# Patient Record
Sex: FEMALE | Race: WHITE | Employment: FULL TIME | URBAN - METROPOLITAN AREA
[De-identification: names, ages, dates, MRNs, and addresses within clinical notes are randomized per-mention and may not be internally consistent; named-entity substitution may affect disease eponyms.]

---

## 2017-01-19 ENCOUNTER — APPOINTMENT (OUTPATIENT)
Dept: CT IMAGING | Age: 57
End: 2017-01-19
Attending: FAMILY MEDICINE
Payer: COMMERCIAL

## 2017-01-19 ENCOUNTER — HOSPITAL ENCOUNTER (OUTPATIENT)
Dept: RESPIRATORY THERAPY | Age: 57
Discharge: HOME | End: 2017-01-19
Attending: FAMILY MEDICINE
Payer: COMMERCIAL

## 2017-01-19 DIAGNOSIS — R05.9 COUGH: ICD-10-CM

## 2017-01-19 PROCEDURE — 74011250636 HC RX REV CODE- 250/636: Performed by: FAMILY MEDICINE

## 2017-01-19 PROCEDURE — 94070 EVALUATION OF WHEEZING: CPT

## 2017-01-19 RX ADMIN — METHACHOLINE CHLORIDE 25 MG: 100 POWDER, FOR SOLUTION RESPIRATORY (INHALATION) at 14:30

## 2017-01-19 RX ADMIN — METHACHOLINE CHLORIDE 0.25 MG: 100 POWDER, FOR SOLUTION RESPIRATORY (INHALATION) at 14:30

## 2017-01-19 RX ADMIN — METHACHOLINE CHLORIDE 2.5 MG: 100 POWDER, FOR SOLUTION RESPIRATORY (INHALATION) at 14:30

## 2017-01-19 RX ADMIN — METHACHOLINE CHLORIDE 0.03 MG: 100 POWDER, FOR SOLUTION RESPIRATORY (INHALATION) at 14:30

## 2017-01-19 RX ADMIN — METHACHOLINE CHLORIDE 10 MG: 100 POWDER, FOR SOLUTION RESPIRATORY (INHALATION) at 14:30

## 2017-03-17 ENCOUNTER — HOSPITAL ENCOUNTER (OUTPATIENT)
Dept: MAMMOGRAPHY | Age: 57
Discharge: HOME | End: 2017-03-17
Attending: OBSTETRICS & GYNECOLOGY
Payer: COMMERCIAL

## 2017-03-17 DIAGNOSIS — Z12.31 VISIT FOR SCREENING MAMMOGRAM: ICD-10-CM

## 2017-03-17 PROCEDURE — 77067 SCR MAMMO BI INCL CAD: CPT

## 2017-06-07 ENCOUNTER — HOSPITAL ENCOUNTER (OUTPATIENT)
Dept: PHYSICAL THERAPY | Age: 57
Discharge: HOME | End: 2017-06-07
Payer: COMMERCIAL

## 2017-06-07 PROCEDURE — 97140 MANUAL THERAPY 1/> REGIONS: CPT

## 2017-06-07 PROCEDURE — 97161 PT EVAL LOW COMPLEX 20 MIN: CPT

## 2017-06-07 NOTE — PROGRESS NOTES
Ambulatory/Rehab Services H2 Model Falls Risk Assessment    Risk Factor Pts. ·   Confusion/Disorientation/Impulsivity  []    4 ·   Symptomatic Depression  []   2 ·   Altered Elimination  []   1 ·   Dizziness/Vertigo  []   1 ·   Gender (Male)  []   1 ·   Any administered antiepileptics (anticonvulsants):  []   2 ·   Any administered benzodiazepines:  []   1 ·   Visual Impairment (specify):  []   1 ·   Portable Oxygen Use  []   1 ·   Orthostatic ? BP  []   1 ·   History of Recent Falls (within 3 mos.)  []   5     Ability to Rise from Chair (choose one) Pts. ·   Ability to rise in a single movement  [x]   0 ·   Pushes up, successful in one attempt  []   1 ·   Multiple attempts, but successful  []   3 ·   Unable to rise without assistance  []   4   Total: (5 or greater = High Risk) 0     Falls Prevention Plan:   []                Physical Limitations to Exercise (specify):   []                Mobility Assistance Device (type):   []                Exercise/Equipment Adaptation (specify):    ©2010 [facility] [address] Patent #[id number].  Federal Law prohibits the replication, distribution or use without written permission from [facility] [employer]

## 2017-06-07 NOTE — PROGRESS NOTES
Pilar Fowler  : 1960 [facility] at [address] [place] [address]  [phone]   [phone]          OUTPATIENT PHYSICAL THERAPY:Initial Assessment 2017    ICD-10: Treatment Diagnosis: Pain in joint, left knee, M25.562  Patellofemoral disorders, left knee, M22.2X2  Difficulty walking, not elsewhere classified, R26.2  Precautions/Allergies:   Lactose; Penicillins; and Gluten   Fall Risk Score: 0 (? 5 = High Risk)  MD Orders: Evaluate and Treat MEDICAL/REFERRING DIAGNOSIS:  Pain in left knee [M25.562]  Chondromalacia patellae, left knee [M22.42]   DATE OF ONSET: Chronic/ exacerbation for 4 weeks  REFERRING PHYSICIAN: Pan Colorado MD  RETURN PHYSICIAN APPOINTMENT: 6 weeks     INITIAL ASSESSMENT:  Ms. Asif Miranda presents with left knee pain due to both previous surgery and old scar tissue as well as poor joint mechanics. She shows increased posterior medial knee pain related to medial gastroc and medial hamstring tendons. She is a good candidate for skilled PT in order to address listed impairments affecting her function. PROBLEM LIST (Impacting functional limitations):  1. Decreased ADL/Functional Activities  2. Decreased Transfer Abilities  3. Decreased Balance  4. Increased Pain  5. Decreased Activity Tolerance  6. Decreased Flexibility/Joint Mobility INTERVENTIONS PLANNED:  1. Balance Exercise  2. Cold  3. Electrical Stimulation  4. Heat  5. Manual Therapy  6. Neuromuscular Re-education/Strengthening  7. Range of Motion (ROM)  8. Therapeutic Activites  9. Therapeutic Exercise/Strengthening   TREATMENT PLAN:  Effective Dates: 17 TO 17. Frequency/Duration: 1 time a week for 4 weeks  GOALS: (Goals have been discussed and agreed upon with patient.)  Short-Term Functional Goals: Time Frame: 2 weeks  1. Patient will report a greater than 25% improvement in overall symptoms in order to return to activity  2.  Patient will show a greater than 2 deg increase in left knee extension in order to progress to neutral knee extension  3. Patient will be independent in all HEP for knee and hip mobility  Discharge Goals: Time Frame: 4 weeks  4. Patient will report a greater than 50% improvement in overall symptoms in order to return to activity  5. Patient will show a greater than 5 point increase on the LEFS in order to show an increase in function. 6. Patient will return to exercises in the gym for increased function  7. Patient will be independent in all HEP for knee and hip mobility and knee stability  Rehabilitation Potential For Stated Goals: Good  Regarding Yuridia Long's therapy, I certify that the treatment plan above will be carried out by a therapist or under their direction. Thank you for this referral,  Joyce Nunez PT     Referring Physician Signature: Camryn Suárez MD              Date                    The information in this section was collected on 6/7/17 (except where otherwise noted). HISTORY:   History of Present Injury/Illness (Reason for Referral):  Patient reports that she has had knee issues since she had the surgery in 1977 for the patella. They had kept her in a leg brace for several weeks and she still has some lack of motion back then. Since then she gets some intermittent knee pain on the inside of the patella and lately more on the back of the knee. It is exacerbated with increased stretching and in the morning more than the afternoon. I can go up stairs just fine sometimes and other times have a little trouble. X-rays show no change in arthritis over a three year time frame, a bakers cyst is present and it is inconclusive for a meniscus tear. Patient did receive a cortisone injection last week that helped a lot with the pain in the medial patella  Past Medical History/Comorbidities:   Ms. Mary Aguirre  has a past medical history of Aortic regurgitation; Basal cell carcinoma (2012);  Endometriosis; Goiter; Hypothyroidism due to Hashimoto's thyroiditis; Lactose intolerance; and Osteopenia (2014). Ms. Magdalena López  has a past surgical history that includes  section; cholecystectomy; orthopaedic; heent; and breast biopsy (Bilateral, 1985). Social History/Living Environment:       Social History     Social History    Marital status:      Spouse name: N/A    Number of children: N/A    Years of education: N/A     Occupational History    Not on file. Social History Main Topics    Smoking status: Never Smoker    Smokeless tobacco: Never Used    Alcohol use 0.0 oz/week     0 Standard drinks or equivalent per week      Comment: social    Drug use: No    Sexual activity: Not Currently     Other Topics Concern    Blood Transfusions No    Caffeine Concern No    Exercise Yes     working out at gym- [employer]/wave machine, some weights, pilates, walking dog     Social History Narrative       Prior Level of Function/Work/Activity:  Independent, daily   Dominant Side:         RIGHT  Current Medications:       Current Outpatient Prescriptions:     Levothyroxine (TIROSINT) 125 mcg cap, Take 1 Cap by mouth daily. , Disp: 30 Cap, Rfl: 3    liothyronine (CYTOMEL) 5 mcg tablet, Take 1 Tab by mouth daily. , Disp: 30 Tab, Rfl: 3    azelastine (ASTELIN) 137 mcg (0.1 %) nasal spray, 1 [place] by Both Nostrils route two (2) times a day. Use in each nostril as directed, Disp: 1 Bottle, Rfl: 0    Cholecalciferol, Vitamin D3, 2,000 unit/drop drop, Take 1,000 Units by mouth., Disp: , Rfl:     cyanocobalamin 2,000 mcg TbER, Take 2,000 mcg by mouth., Disp: , Rfl:    Date Last Reviewed:  2017     Number of Personal Factors/Comorbidities that affect the Plan of Care: 1-2: MODERATE COMPLEXITY   EXAMINATION:   Observation/Orthostatic Postural Assessment:          Patient arrives with a knee brace on the left knee. She shows increased old scaring at the medial patella from surgery for patellar dislocation.   Left gastroc appears slightly atrophied compared to right side, increased left knee valgus as well as slight tibial external rotation on the left. Squat function shows slight tracking over medial big toe rather than 2nd toe. Palpation:          Patella is tilted laterally. Some scar tissue restriction, but no affecting her tracking with knee flexion and extension. Increased medial hamstring and medial gastroc tightness with pain at intersection of these two muscles. She has a harder end feel with movement of tibial on femur more with slight external rotation being the hardest.    ROM:          R LE (0-132 deg), L LE (-5 -122 deg)  Hip shows good flexion bilaterally, ER is limited more on the right than left, but still in functional range  Strength:          4+/5 for knee flexion and extension. Special Tests:          (-) ligament stress tests for medial and lateral collateral, (-) meniscus tests (compression with rotation and Laquita's)   Body Structures Involved:  1. Bones  2. Joints  3. Muscles  4. Ligaments Body Functions Affected:  1. Neuromusculoskeletal  2. Movement Related Activities and Participation Affected:  1. General Tasks and Demands  2. Mobility  3. Community, Social and [place]   Number of elements (examined above) that affect the Plan of Care: 4+: HIGH COMPLEXITY   CLINICAL PRESENTATION:   Presentation: Stable and uncomplicated: LOW COMPLEXITY   CLINICAL DECISION MAKING:   Outcome Measure: Tool Used: Lower Extremity Functional Scale (LEFS)  Score:  Initial: 40/80 Most Recent: X/80 (Date: -- )   Interpretation of Score: 20 questions each scored on a 5 point scale with 0 representing \"extreme difficulty or unable to perform\" and 4 representing \"no difficulty\". The lower the score, the greater the functional disability. 80/80 represents no disability. Minimal detectable change is 9 points.   Score 80 79-63 62-48 47-32 31-16 15-1 0   Modifier CH CI CJ CK CL CM CN       Medical Necessity:   · Patient is expected to demonstrate progress in strength, range of motion, balance, coordination and functional technique to improve functional mobility. · Patient demonstrates good rehab potential due to higher previous functional level. · Skilled intervention continues to be required due to increased pain and decreased mobility. Reason for Services/Other Comments:  · Patient continues to require skilled intervention due to increased pain. Use of outcome tool(s) and clinical judgement create a POC that gives a: Clear prediction of patient's progress: LOW COMPLEXITY            TREATMENT:   (In addition to Assessment/Re-Assessment sessions the following treatments were rendered)  Pre-treatment Symptoms/Complaints:  Patient reports a tightness in the back of the knee with some pain still on the inside next to the knee cap. Pain: Initial:   Pain Intensity 1: 4 back of the knee today Post Session:  1/10     MANUAL THERAPY: (15 minutes): Joint mobilization and Soft tissue mobilization was utilized and necessary because of the patient's restricted joint motion and restricted motion of soft tissue. -STM in prone to medial hamstring, medial gastroc and utilized mobilization with movement for restrictions. Tibia on femur glides for increased congruency. -HEP rectus femoris stretch, doorway hamstring stretch, gastroc stretch    Treatment/Session Assessment:    · Response to Treatment:  Patient reports feeling better after manual treatment. She understands HEP and POC at this time. · Compliance with Program/Exercises: compliant all of the time. · Recommendations/Intent for next treatment session: \"Next visit will focus on Soft tissue and joint mechanics\".   Total Treatment Duration:Evaluation from 0730 to 0805, Treatment from 0805 to 0820  PT Patient Time In/Time Out  Time In: 0730  Time Out: Jacob 143, PT

## 2017-06-13 ENCOUNTER — HOSPITAL ENCOUNTER (OUTPATIENT)
Dept: PHYSICAL THERAPY | Age: 57
Discharge: HOME | End: 2017-06-13
Payer: COMMERCIAL

## 2017-06-13 PROCEDURE — 97140 MANUAL THERAPY 1/> REGIONS: CPT

## 2017-06-13 PROCEDURE — 97110 THERAPEUTIC EXERCISES: CPT

## 2017-06-13 NOTE — PROGRESS NOTES
Yakov Kendall  : 1960 [facility] at [address] [place] [address]  [phone]   [phone]          OUTPATIENT PHYSICAL THERAPY:Daily Note 2017    ICD-10: Treatment Diagnosis: Pain in joint, left knee, M25.562  Patellofemoral disorders, left knee, M22.2X2  Difficulty walking, not elsewhere classified, R26.2  Precautions/Allergies:   Lactose; Penicillins; and Gluten   Fall Risk Score: 0 (? 5 = High Risk)  MD Orders: Evaluate and Treat MEDICAL/REFERRING DIAGNOSIS:  Pain in left knee [M25.562]  Chondromalacia patellae, left knee [M22.42]   DATE OF ONSET: Chronic/ exacerbation for 4 weeks  REFERRING PHYSICIAN: Abby Gates MD  RETURN PHYSICIAN APPOINTMENT: 6 weeks     INITIAL ASSESSMENT:  Ms. Isaiah Beltran presents with left knee pain due to both previous surgery and old scar tissue as well as poor joint mechanics. She shows increased posterior medial knee pain related to medial gastroc and medial hamstring tendons. She is a good candidate for skilled PT in order to address listed impairments affecting her function. PROBLEM LIST (Impacting functional limitations):  1. Decreased ADL/Functional Activities  2. Decreased Transfer Abilities  3. Decreased Balance  4. Increased Pain  5. Decreased Activity Tolerance  6. Decreased Flexibility/Joint Mobility INTERVENTIONS PLANNED:  1. Balance Exercise  2. Cold  3. Electrical Stimulation  4. Heat  5. Manual Therapy  6. Neuromuscular Re-education/Strengthening  7. Range of Motion (ROM)  8. Therapeutic Activites  9. Therapeutic Exercise/Strengthening   TREATMENT PLAN:  Effective Dates: 17 TO 17. Frequency/Duration: 1 time a week for 4 weeks  GOALS: (Goals have been discussed and agreed upon with patient.)  Short-Term Functional Goals: Time Frame: 2 weeks  1. Patient will report a greater than 25% improvement in overall symptoms in order to return to activity  2.  Patient will show a greater than 2 deg increase in left knee extension in order to progress to neutral knee extension  3. Patient will be independent in all HEP for knee and hip mobility  Discharge Goals: Time Frame: 4 weeks  4. Patient will report a greater than 50% improvement in overall symptoms in order to return to activity  5. Patient will show a greater than 5 point increase on the LEFS in order to show an increase in function. 6. Patient will return to exercises in the gym for increased function  7. Patient will be independent in all HEP for knee and hip mobility and knee stability  Rehabilitation Potential For Stated Goals: Good  Regarding Yuridia Long's therapy, I certify that the treatment plan above will be carried out by a therapist or under their direction. Thank you for this referral,  Eusebia Sutton PT     Referring Physician Signature: Snow Quinones MD              Date                    The information in this section was collected on 6/7/17 (except where otherwise noted). HISTORY:   History of Present Injury/Illness (Reason for Referral):  Patient reports that she has had knee issues since she had the surgery in 1977 for the patella. They had kept her in a leg brace for several weeks and she still has some lack of motion back then. Since then she gets some intermittent knee pain on the inside of the patella and lately more on the back of the knee. It is exacerbated with increased stretching and in the morning more than the afternoon. I can go up stairs just fine sometimes and other times have a little trouble. X-rays show no change in arthritis over a three year time frame, a bakers cyst is present and it is inconclusive for a meniscus tear. Patient did receive a cortisone injection last week that helped a lot with the pain in the medial patella  Past Medical History/Comorbidities:   Ms. Elizabeth Reese  has a past medical history of Aortic regurgitation; Basal cell carcinoma (2012);  Endometriosis; Goiter; Hypothyroidism due to Hashimoto's thyroiditis; Lactose intolerance; and Osteopenia (2014). Ms. Travis Null  has a past surgical history that includes  section; cholecystectomy; orthopaedic; heent; and breast biopsy (Bilateral, 1985). Social History/Living Environment:       Social History     Social History    Marital status:      Spouse name: N/A    Number of children: N/A    Years of education: N/A     Occupational History    Not on file. Social History Main Topics    Smoking status: Never Smoker    Smokeless tobacco: Never Used    Alcohol use 0.0 oz/week     0 Standard drinks or equivalent per week      Comment: social    Drug use: No    Sexual activity: Not Currently     Other Topics Concern    Blood Transfusions No    Caffeine Concern No    Exercise Yes     working out at gym- [employer]/wave machine, some weights, pilates, walking dog     Social History Narrative       Prior Level of Function/Work/Activity:  Independent, daily   Dominant Side:         RIGHT  Current Medications:       Current Outpatient Prescriptions:     Levothyroxine (TIROSINT) 125 mcg cap, Take 1 Cap by mouth daily. , Disp: 30 Cap, Rfl: 3    liothyronine (CYTOMEL) 5 mcg tablet, Take 1 Tab by mouth daily. , Disp: 30 Tab, Rfl: 3    azelastine (ASTELIN) 137 mcg (0.1 %) nasal spray, 1 [place] by Both Nostrils route two (2) times a day. Use in each nostril as directed, Disp: 1 Bottle, Rfl: 0    Cholecalciferol, Vitamin D3, 2,000 unit/drop drop, Take 1,000 Units by mouth., Disp: , Rfl:     cyanocobalamin 2,000 mcg TbER, Take 2,000 mcg by mouth., Disp: , Rfl:    Date Last Reviewed:  2017     Number of Personal Factors/Comorbidities that affect the Plan of Care: 1-2: MODERATE COMPLEXITY   EXAMINATION:   Observation/Orthostatic Postural Assessment:          Patient arrives with a knee brace on the left knee. She shows increased old scaring at the medial patella from surgery for patellar dislocation.   Left gastroc appears slightly atrophied compared to right side, increased left knee valgus as well as slight tibial external rotation on the left. Squat function shows slight tracking over medial big toe rather than 2nd toe. Palpation:          Patella is tilted laterally. Some scar tissue restriction, but no affecting her tracking with knee flexion and extension. Increased medial hamstring and medial gastroc tightness with pain at intersection of these two muscles. She has a harder end feel with movement of tibial on femur more with slight external rotation being the hardest.    ROM:          R LE (0-132 deg), L LE (-5 -122 deg)  Hip shows good flexion bilaterally, ER is limited more on the right than left, but still in functional range  Strength:          4+/5 for knee flexion and extension. Special Tests:          (-) ligament stress tests for medial and lateral collateral, (-) meniscus tests (compression with rotation and Laquita's)   Body Structures Involved:  1. Bones  2. Joints  3. Muscles  4. Ligaments Body Functions Affected:  1. Neuromusculoskeletal  2. Movement Related Activities and Participation Affected:  1. General Tasks and Demands  2. Mobility  3. Community, Social and [place]   Number of elements (examined above) that affect the Plan of Care: 4+: HIGH COMPLEXITY   CLINICAL PRESENTATION:   Presentation: Stable and uncomplicated: LOW COMPLEXITY   CLINICAL DECISION MAKING:   Outcome Measure: Tool Used: Lower Extremity Functional Scale (LEFS)  Score:  Initial: 40/80 Most Recent: X/80 (Date: -- )   Interpretation of Score: 20 questions each scored on a 5 point scale with 0 representing \"extreme difficulty or unable to perform\" and 4 representing \"no difficulty\". The lower the score, the greater the functional disability. 80/80 represents no disability. Minimal detectable change is 9 points.   Score 80 79-63 62-48 47-32 31-16 15-1 0   Modifier CH CI CJ CK CL CM CN       Medical Necessity:   · Patient is expected to demonstrate progress in strength, range of motion, balance, coordination and functional technique to improve functional mobility. · Patient demonstrates good rehab potential due to higher previous functional level. · Skilled intervention continues to be required due to increased pain and decreased mobility. Reason for Services/Other Comments:  · Patient continues to require skilled intervention due to increased pain. Use of outcome tool(s) and clinical judgement create a POC that gives a: Clear prediction of patient's progress: LOW COMPLEXITY            TREATMENT:   (In addition to Assessment/Re-Assessment sessions the following treatments were rendered)  Pre-treatment Symptoms/Complaints:  Patient reports that she had a little bit of a difficult time with travel this past week, and is feeling it in both legs a little today  Pain: Initial:   Pain Intensity 1: 4 back of the knee today Post Session:  1/10     THERAPEUTIC EXERCISE: (15 minutes):  Exercises per grid below to improve mobility, strength, balance and coordination. Required minimal visual, verbal and manual cues to promote proper body alignment, promote proper body posture and promote proper body mechanics. Progressed resistance, range and repetitions as indicated. Date:  6/13/2017     Activity/Exercise Parameters   Stretches 5 min for hamstring mobility and rectus femoris mobility  Gastroc stretch x 1 min on slant board   Hip abduction Side lied and standing x 10 each   IT band stretch X 3 min                       MANUAL THERAPY: (40 minutes): Joint mobilization and Soft tissue mobilization was utilized and necessary because of the patient's restricted joint motion and restricted motion of soft tissue. -STM in prone to medial hamstring, medial gastroc and utilized mobilization with movement for restrictions. Tibia on femur glides for increased congruency.       -HEP rectus femoris stretch, doorway hamstring stretch, gastroc stretch    Treatment/Session Assessment:    · Response to Treatment:  Patient reports feeling better after manual treatment. Significant improvement with treatment today. Continue to assess throughout  · Compliance with Program/Exercises: compliant all of the time. · Recommendations/Intent for next treatment session: \"Next visit will focus on Soft tissue and joint mechanics\".   Total Treatment Duration:  PT Patient Time In/Time Out  Time In: 1600  Time Out: SHIRLENE Bardales

## 2017-06-21 ENCOUNTER — HOSPITAL ENCOUNTER (OUTPATIENT)
Dept: PHYSICAL THERAPY | Age: 57
Discharge: HOME | End: 2017-06-21
Payer: COMMERCIAL

## 2017-06-21 PROCEDURE — 97140 MANUAL THERAPY 1/> REGIONS: CPT

## 2017-06-21 PROCEDURE — 97110 THERAPEUTIC EXERCISES: CPT

## 2017-06-21 NOTE — PROGRESS NOTES
Humaira Mcneill  : 1960 [facility] [address] [place] [address]  [phone]   [phone]          OUTPATIENT PHYSICAL THERAPY:Daily Note 2017    ICD-10: Treatment Diagnosis: Pain in joint, left knee, M25.562  Patellofemoral disorders, left knee, M22.2X2  Difficulty walking, not elsewhere classified, R26.2  Precautions/Allergies:   Lactose; Penicillins; and Gluten   Fall Risk Score: 0 (? 5 = High Risk)  MD Orders: Evaluate and Treat MEDICAL/REFERRING DIAGNOSIS:  Pain in left knee [M25.562]  Chondromalacia patellae, left knee [M22.42]   DATE OF ONSET: Chronic/ exacerbation for 4 weeks  REFERRING PHYSICIAN: Jose Delarosa MD  RETURN PHYSICIAN APPOINTMENT: 6 weeks     INITIAL ASSESSMENT:  Ms. Blanka Tadeo presents with left knee pain due to both previous surgery and old scar tissue as well as poor joint mechanics. She shows increased posterior medial knee pain related to medial gastroc and medial hamstring tendons. She is a good candidate for skilled PT in order to address listed impairments affecting her function. PROBLEM LIST (Impacting functional limitations):  1. Decreased ADL/Functional Activities  2. Decreased Transfer Abilities  3. Decreased Balance  4. Increased Pain  5. Decreased Activity Tolerance  6. Decreased Flexibility/Joint Mobility INTERVENTIONS PLANNED:  1. Balance Exercise  2. Cold  3. Electrical Stimulation  4. Heat  5. Manual Therapy  6. Neuromuscular Re-education/Strengthening  7. Range of Motion (ROM)  8. Therapeutic Activites  9. Therapeutic Exercise/Strengthening   TREATMENT PLAN:  Effective Dates: 17 TO 17. Frequency/Duration: 1 time a week for 4 weeks  GOALS: (Goals have been discussed and agreed upon with patient.)  Short-Term Functional Goals: Time Frame: 2 weeks  1. Patient will report a greater than 25% improvement in overall symptoms in order to return to activity  2.  Patient will show a greater than 2 deg increase in left knee extension in order to progress to neutral knee extension  3. Patient will be independent in all HEP for knee and hip mobility  Discharge Goals: Time Frame: 4 weeks  4. Patient will report a greater than 50% improvement in overall symptoms in order to return to activity  5. Patient will show a greater than 5 point increase on the LEFS in order to show an increase in function. 6. Patient will return to exercises in the gym for increased function  7. Patient will be independent in all HEP for knee and hip mobility and knee stability  Rehabilitation Potential For Stated Goals: Good  Regarding Yuridia Long's therapy, I certify that the treatment plan above will be carried out by a therapist or under their direction. Thank you for this referral,  Néstor Nur PT     Referring Physician Signature: Jose Delarosa MD              Date                    The information in this section was collected on 6/7/17 (except where otherwise noted). HISTORY:   History of Present Injury/Illness (Reason for Referral):  Patient reports that she has had knee issues since she had the surgery in 1977 for the patella. They had kept her in a leg brace for several weeks and she still has some lack of motion back then. Since then she gets some intermittent knee pain on the inside of the patella and lately more on the back of the knee. It is exacerbated with increased stretching and in the morning more than the afternoon. I can go up stairs just fine sometimes and other times have a little trouble. X-rays show no change in arthritis over a three year time frame, a bakers cyst is present and it is inconclusive for a meniscus tear. Patient did receive a cortisone injection last week that helped a lot with the pain in the medial patella  Past Medical History/Comorbidities:   Ms. Blanka Tadeo  has a past medical history of Aortic regurgitation; Basal cell carcinoma (2012);  Endometriosis; Goiter; Hypothyroidism due to Hashimoto's thyroiditis; Lactose intolerance; and Osteopenia (2014). Ms. Rebekah Diaz  has a past surgical history that includes  section; cholecystectomy; orthopaedic; heent; and breast biopsy (Bilateral, 1985). Social History/Living Environment:       Social History     Social History    Marital status:      Spouse name: N/A    Number of children: N/A    Years of education: N/A     Occupational History    Not on file. Social History Main Topics    Smoking status: Never Smoker    Smokeless tobacco: Never Used    Alcohol use 0.0 oz/week     0 Standard drinks or equivalent per week      Comment: social    Drug use: No    Sexual activity: Not Currently     Other Topics Concern    Blood Transfusions No    Caffeine Concern No    Exercise Yes     working out at gym- [employer]/wave machine, some weights, pilates, walking dog     Social History Narrative       Prior Level of Function/Work/Activity:  Independent, daily   Dominant Side:         RIGHT  Current Medications:       Current Outpatient Prescriptions:     liothyronine (CYTOMEL) 5 mcg tablet, Take 2 Tabs by mouth daily. , Disp: 180 Tab, Rfl: 3    Levothyroxine (TIROSINT) 125 mcg cap, Take 1 Cap by mouth daily. , Disp: 90 Cap, Rfl: 1    Cholecalciferol, Vitamin D3, 2,000 unit/drop drop, Take 1,000 Units by mouth., Disp: , Rfl:     cyanocobalamin 2,000 mcg TbER, Take 2,000 mcg by mouth., Disp: , Rfl:    Date Last Reviewed:  2017     Number of Personal Factors/Comorbidities that affect the Plan of Care: 1-2: MODERATE COMPLEXITY   EXAMINATION:   Observation/Orthostatic Postural Assessment:          Patient arrives with a knee brace on the left knee. She shows increased old scaring at the medial patella from surgery for patellar dislocation. Left gastroc appears slightly atrophied compared to right side, increased left knee valgus as well as slight tibial external rotation on the left.   Squat function shows slight tracking over medial big toe rather than 2nd toe. Palpation:          Patella is tilted laterally. Some scar tissue restriction, but no affecting her tracking with knee flexion and extension. Increased medial hamstring and medial gastroc tightness with pain at intersection of these two muscles. She has a harder end feel with movement of tibial on femur more with slight external rotation being the hardest.    ROM:          R LE (0-132 deg), L LE (-5 -122 deg)  Hip shows good flexion bilaterally, ER is limited more on the right than left, but still in functional range  Strength:          4+/5 for knee flexion and extension. Special Tests:          (-) ligament stress tests for medial and lateral collateral, (-) meniscus tests (compression with rotation and Laquita's)   Body Structures Involved:  1. Bones  2. Joints  3. Muscles  4. Ligaments Body Functions Affected:  1. Neuromusculoskeletal  2. Movement Related Activities and Participation Affected:  1. General Tasks and Demands  2. Mobility  3. Community, Social and [employer]   Number of elements (examined above) that affect the Plan of Care: 4+: HIGH COMPLEXITY   CLINICAL PRESENTATION:   Presentation: Stable and uncomplicated: LOW COMPLEXITY   CLINICAL DECISION MAKING:   Outcome Measure: Tool Used: Lower Extremity Functional Scale (LEFS)  Score:  Initial: 40/80 Most Recent: X/80 (Date: -- )   Interpretation of Score: 20 questions each scored on a 5 point scale with 0 representing \"extreme difficulty or unable to perform\" and 4 representing \"no difficulty\". The lower the score, the greater the functional disability. 80/80 represents no disability. Minimal detectable change is 9 points. Score 80 79-63 62-48 47-32 31-16 15-1 0   Modifier CH CI CJ CK CL CM CN       Medical Necessity:   · Patient is expected to demonstrate progress in strength, range of motion, balance, coordination and functional technique to improve functional mobility.   · Patient demonstrates good rehab potential due to higher previous functional level. · Skilled intervention continues to be required due to increased pain and decreased mobility. Reason for Services/Other Comments:  · Patient continues to require skilled intervention due to increased pain. Use of outcome tool(s) and clinical judgement create a POC that gives a: Clear prediction of patient's progress: LOW COMPLEXITY            TREATMENT:   (In addition to Assessment/Re-Assessment sessions the following treatments were rendered)  Pre-treatment Symptoms/Complaints:  Patient reports that she is feeling a little better today in the knee, but last week the brace got twisted underneath her pants and she had some pain for a few days after. I still am really stiff first thing in the morning and have a lot of  Arm soreness from pushing up off of the toilet. Pain: Initial:   Pain Intensity 1: 3 back of the knee today Post Session:  1/10     THERAPEUTIC EXERCISE: (25 minutes):  Exercises per grid below to improve mobility, strength, balance and coordination. Required minimal visual, verbal and manual cues to promote proper body alignment, promote proper body posture and promote proper body mechanics. Progressed resistance, range and repetitions as indicated.    Date:  6/21/2017     Activity/Exercise Parameters   Stretches 5 min for hamstring mobility and rectus femoris mobility  Gastroc stretch x 1 min on slant board   Hip abduction Side lied and standing x 10 each  Side step with red band 4 x 15 ft down and back   IT band stretch X 3 min   Monster walks Red band 4 x 15 feet down and back   Balance Fitter board side to side and front to back x 5 min each  Single leg hip hinge with slight knee bend 2 x 10 on left LE   Kneeling practice On R LE with push up x 2 min           MANUAL THERAPY: (30 minutes): Joint mobilization and Soft tissue mobilization was utilized and necessary because of the patient's restricted joint motion and restricted motion of soft tissue. -STM in prone to medial hamstring, medial gastroc and utilized mobilization with movement for restrictions. Tibia on femur glides for increased congruency.    -Patellar glides for decreased mobility  -HEP rectus femoris stretch, doorway hamstring stretch, gastroc stretch    Treatment/Session Assessment:    · Response to Treatment:  Patient reports feeling better after manual treatment. Improved motion and function after session  · Compliance with Program/Exercises: compliant all of the time. · Recommendations/Intent for next treatment session: \"Next visit will focus on Soft tissue and joint mechanics\".   Total Treatment Duration:  PT Patient Time In/Time Out  Time In: 0827  Time Out: [facility], PT

## 2017-06-26 ENCOUNTER — HOSPITAL ENCOUNTER (OUTPATIENT)
Dept: PHYSICAL THERAPY | Age: 57
Discharge: HOME | End: 2017-06-26
Payer: COMMERCIAL

## 2017-06-26 PROCEDURE — 97110 THERAPEUTIC EXERCISES: CPT

## 2017-06-26 PROCEDURE — 97140 MANUAL THERAPY 1/> REGIONS: CPT

## 2017-06-26 NOTE — PROGRESS NOTES
Sonny Young  : 1960 [facility] at [address] [place] [address]  [phone]   [phone]          OUTPATIENT PHYSICAL THERAPY:Daily Note 2017    ICD-10: Treatment Diagnosis: Pain in joint, left knee, M25.562  Patellofemoral disorders, left knee, M22.2X2  Difficulty walking, not elsewhere classified, R26.2  Precautions/Allergies:   Lactose; Penicillins; and Gluten   Fall Risk Score: 0 (? 5 = High Risk)  MD Orders: Evaluate and Treat MEDICAL/REFERRING DIAGNOSIS:  Pain in left knee [M25.562]  Chondromalacia patellae, left knee [M22.42]   DATE OF ONSET: Chronic/ exacerbation for 4 weeks  REFERRING PHYSICIAN: Smiley Santoro MD  RETURN PHYSICIAN APPOINTMENT: 6 weeks     INITIAL ASSESSMENT:  Ms. Everett Nielsen presents with left knee pain due to both previous surgery and old scar tissue as well as poor joint mechanics. She shows increased posterior medial knee pain related to medial gastroc and medial hamstring tendons. She is a good candidate for skilled PT in order to address listed impairments affecting her function. PROBLEM LIST (Impacting functional limitations):  1. Decreased ADL/Functional Activities  2. Decreased Transfer Abilities  3. Decreased Balance  4. Increased Pain  5. Decreased Activity Tolerance  6. Decreased Flexibility/Joint Mobility INTERVENTIONS PLANNED:  1. Balance Exercise  2. Cold  3. Electrical Stimulation  4. Heat  5. Manual Therapy  6. Neuromuscular Re-education/Strengthening  7. Range of Motion (ROM)  8. Therapeutic Activites  9. Therapeutic Exercise/Strengthening   TREATMENT PLAN:  Effective Dates: 17 TO 17. Frequency/Duration: 1 time a week for 4 weeks  GOALS: (Goals have been discussed and agreed upon with patient.)  Short-Term Functional Goals: Time Frame: 2 weeks  1. Patient will report a greater than 25% improvement in overall symptoms in order to return to activity  2.  Patient will show a greater than 2 deg increase in left knee extension in order to progress to neutral knee extension  3. Patient will be independent in all HEP for knee and hip mobility  Discharge Goals: Time Frame: 4 weeks  4. Patient will report a greater than 50% improvement in overall symptoms in order to return to activity  5. Patient will show a greater than 5 point increase on the LEFS in order to show an increase in function. 6. Patient will return to exercises in the gym for increased function  7. Patient will be independent in all HEP for knee and hip mobility and knee stability  Rehabilitation Potential For Stated Goals: Good  Regarding Yuridia Long's therapy, I certify that the treatment plan above will be carried out by a therapist or under their direction. Thank you for this referral,  Declan Liao PT     Referring Physician Signature: Dm Romero MD              Date                    The information in this section was collected on 6/7/17 (except where otherwise noted). HISTORY:   History of Present Injury/Illness (Reason for Referral):  Patient reports that she has had knee issues since she had the surgery in 1977 for the patella. They had kept her in a leg brace for several weeks and she still has some lack of motion back then. Since then she gets some intermittent knee pain on the inside of the patella and lately more on the back of the knee. It is exacerbated with increased stretching and in the morning more than the afternoon. I can go up stairs just fine sometimes and other times have a little trouble. X-rays show no change in arthritis over a three year time frame, a bakers cyst is present and it is inconclusive for a meniscus tear. Patient did receive a cortisone injection last week that helped a lot with the pain in the medial patella  Past Medical History/Comorbidities:   Ms. Isabel Smith  has a past medical history of Aortic regurgitation; Basal cell carcinoma (2012);  Endometriosis; Goiter; Hypothyroidism due to Hashimoto's thyroiditis; Lactose intolerance; and Osteopenia (2014). Ms. Danny Allen  has a past surgical history that includes  section; cholecystectomy; orthopaedic; heent; and breast biopsy (Bilateral, 1985). Social History/Living Environment:       Social History     Social History    Marital status:      Spouse name: N/A    Number of children: N/A    Years of education: N/A     Occupational History    Not on file. Social History Main Topics    Smoking status: Never Smoker    Smokeless tobacco: Never Used    Alcohol use 0.0 oz/week     0 Standard drinks or equivalent per week      Comment: social    Drug use: No    Sexual activity: Not Currently     Other Topics Concern    Blood Transfusions No    Caffeine Concern No    Exercise Yes     working out at gym- [employer]/wave machine, some weights, pilates, walking dog     Social History Narrative       Prior Level of Function/Work/Activity:  Independent, daily   Dominant Side:         RIGHT  Current Medications:       Current Outpatient Prescriptions:     liothyronine (CYTOMEL) 5 mcg tablet, Take 2 Tabs by mouth daily. , Disp: 180 Tab, Rfl: 3    Levothyroxine (TIROSINT) 125 mcg cap, Take 1 Cap by mouth daily. , Disp: 90 Cap, Rfl: 1    Cholecalciferol, Vitamin D3, 2,000 unit/drop drop, Take 1,000 Units by mouth., Disp: , Rfl:     cyanocobalamin 2,000 mcg TbER, Take 2,000 mcg by mouth., Disp: , Rfl:    Date Last Reviewed:  2017     Number of Personal Factors/Comorbidities that affect the Plan of Care: 1-2: MODERATE COMPLEXITY   EXAMINATION:   Observation/Orthostatic Postural Assessment:          Patient arrives with a knee brace on the left knee. She shows increased old scaring at the medial patella from surgery for patellar dislocation. Left gastroc appears slightly atrophied compared to right side, increased left knee valgus as well as slight tibial external rotation on the left.   Squat function shows slight tracking over medial big toe rather than 2nd toe. Palpation:          Patella is tilted laterally. Some scar tissue restriction, but no affecting her tracking with knee flexion and extension. Increased medial hamstring and medial gastroc tightness with pain at intersection of these two muscles. She has a harder end feel with movement of tibial on femur more with slight external rotation being the hardest.    ROM:          R LE (0-132 deg), L LE (-5 -122 deg)  Hip shows good flexion bilaterally, ER is limited more on the right than left, but still in functional range  Strength:          4+/5 for knee flexion and extension. Special Tests:          (-) ligament stress tests for medial and lateral collateral, (-) meniscus tests (compression with rotation and Laquita's)   Body Structures Involved:  1. Bones  2. Joints  3. Muscles  4. Ligaments Body Functions Affected:  1. Neuromusculoskeletal  2. Movement Related Activities and Participation Affected:  1. General Tasks and Demands  2. Mobility  3. Community, Social and [place]   Number of elements (examined above) that affect the Plan of Care: 4+: HIGH COMPLEXITY   CLINICAL PRESENTATION:   Presentation: Stable and uncomplicated: LOW COMPLEXITY   CLINICAL DECISION MAKING:   Outcome Measure: Tool Used: Lower Extremity Functional Scale (LEFS)  Score:  Initial: 40/80 Most Recent: X/80 (Date: -- )   Interpretation of Score: 20 questions each scored on a 5 point scale with 0 representing \"extreme difficulty or unable to perform\" and 4 representing \"no difficulty\". The lower the score, the greater the functional disability. 80/80 represents no disability. Minimal detectable change is 9 points. Score 80 79-63 62-48 47-32 31-16 15-1 0   Modifier CH CI CJ CK CL CM CN       Medical Necessity:   · Patient is expected to demonstrate progress in strength, range of motion, balance, coordination and functional technique to improve functional mobility.   · Patient demonstrates good rehab potential due to higher previous functional level. · Skilled intervention continues to be required due to increased pain and decreased mobility. Reason for Services/Other Comments:  · Patient continues to require skilled intervention due to increased pain. Use of outcome tool(s) and clinical judgement create a POC that gives a: Clear prediction of patient's progress: LOW COMPLEXITY            TREATMENT:   (In addition to Assessment/Re-Assessment sessions the following treatments were rendered)  Pre-treatment Symptoms/Complaints:  Patient reports that she has more localized pain today. It is more on the outside of the knee and I feel it more with any twisting. Pain: Initial:   Pain Intensity 1: 2 back of the knee today Post Session:  1/10     THERAPEUTIC EXERCISE: (25 minutes):  Exercises per grid below to improve mobility, strength, balance and coordination. Required minimal visual, verbal and manual cues to promote proper body alignment, promote proper body posture and promote proper body mechanics. Progressed resistance, range and repetitions as indicated. Date:  6/26/2017     Activity/Exercise Parameters   Stretches 5 min for hamstring mobility and rectus femoris mobility  Gastroc stretch x 1 min on slant board   Hip abduction Side lied and standing x 10 each  Side step with red band 4 x 15 ft down and back   IT band stretch X 3 min   Monster walks Red band 4 x 15 feet down and back   Balance Fitter board side to side and front to back x 5 min each  Single leg hip hinge with slight knee bend 2 x 10 on left LE   Kneeling practice On R LE with push up x 2 min           MANUAL THERAPY: (30 minutes): Joint mobilization and Soft tissue mobilization was utilized and necessary because of the patient's restricted joint motion and restricted motion of soft tissue. -STM in prone to medial hamstring, medial gastroc and utilized mobilization with movement for restrictions.   Tibia on femur glides for increased congruency.    -Patellar glides for decreased mobility  -HEP rectus femoris stretch, doorway hamstring stretch, gastroc stretch    Treatment/Session Assessment:    · Response to Treatment:  Patient reports feeling better after manual treatment. Some slight reluctance to bend her knee with walking today  · Compliance with Program/Exercises: compliant all of the time. · Recommendations/Intent for next treatment session: \"Next visit will focus on Soft tissue and joint mechanics\".   Total Treatment Duration:  PT Patient Time In/Time Out  Time In: 0830  Time Out: David Okeefe [facility], PT

## 2017-06-29 ENCOUNTER — HOSPITAL ENCOUNTER (OUTPATIENT)
Dept: PHYSICAL THERAPY | Age: 57
Discharge: HOME | End: 2017-06-29
Payer: COMMERCIAL

## 2017-06-29 PROCEDURE — 97110 THERAPEUTIC EXERCISES: CPT

## 2017-06-29 PROCEDURE — 97140 MANUAL THERAPY 1/> REGIONS: CPT

## 2017-06-29 NOTE — PROGRESS NOTES
Carlos Braun  : 1960 [facility] [address] [place] [address]  [phone]   [phone]          OUTPATIENT PHYSICAL THERAPY:Daily Note 2017    ICD-10: Treatment Diagnosis: Pain in joint, left knee, M25.562  Patellofemoral disorders, left knee, M22.2X2  Difficulty walking, not elsewhere classified, R26.2  Precautions/Allergies:   Lactose; Penicillins; and Gluten   Fall Risk Score: 0 (? 5 = High Risk)  MD Orders: Evaluate and Treat MEDICAL/REFERRING DIAGNOSIS:  Pain in left knee [M25.562]  Chondromalacia patellae, left knee [M22.42]   DATE OF ONSET: Chronic/ exacerbation for 4 weeks  REFERRING PHYSICIAN: Alex Olson MD  RETURN PHYSICIAN APPOINTMENT: 6 weeks     INITIAL ASSESSMENT:  Ms. Wally Ozuna presents with left knee pain due to both previous surgery and old scar tissue as well as poor joint mechanics. She shows increased posterior medial knee pain related to medial gastroc and medial hamstring tendons. She is a good candidate for skilled PT in order to address listed impairments affecting her function. PROBLEM LIST (Impacting functional limitations):  1. Decreased ADL/Functional Activities  2. Decreased Transfer Abilities  3. Decreased Balance  4. Increased Pain  5. Decreased Activity Tolerance  6. Decreased Flexibility/Joint Mobility INTERVENTIONS PLANNED:  1. Balance Exercise  2. Cold  3. Electrical Stimulation  4. Heat  5. Manual Therapy  6. Neuromuscular Re-education/Strengthening  7. Range of Motion (ROM)  8. Therapeutic Activites  9. Therapeutic Exercise/Strengthening   TREATMENT PLAN:  Effective Dates: 17 TO 17. Frequency/Duration: 1 time a week for 4 weeks  GOALS: (Goals have been discussed and agreed upon with patient.)  Short-Term Functional Goals: Time Frame: 2 weeks  1. Patient will report a greater than 25% improvement in overall symptoms in order to return to activity  2.  Patient will show a greater than 2 deg increase in left knee extension in order to progress to neutral knee extension  3. Patient will be independent in all HEP for knee and hip mobility  Discharge Goals: Time Frame: 4 weeks  4. Patient will report a greater than 50% improvement in overall symptoms in order to return to activity  5. Patient will show a greater than 5 point increase on the LEFS in order to show an increase in function. 6. Patient will return to exercises in the gym for increased function  7. Patient will be independent in all HEP for knee and hip mobility and knee stability  Rehabilitation Potential For Stated Goals: Good  Regarding Yuridia Long's therapy, I certify that the treatment plan above will be carried out by a therapist or under their direction. Thank you for this referral,  Ciro Grant PT     Referring Physician Signature: Allison Azul MD              Date                    The information in this section was collected on 6/7/17 (except where otherwise noted). HISTORY:   History of Present Injury/Illness (Reason for Referral):  Patient reports that she has had knee issues since she had the surgery in 1977 for the patella. They had kept her in a leg brace for several weeks and she still has some lack of motion back then. Since then she gets some intermittent knee pain on the inside of the patella and lately more on the back of the knee. It is exacerbated with increased stretching and in the morning more than the afternoon. I can go up stairs just fine sometimes and other times have a little trouble. X-rays show no change in arthritis over a three year time frame, a bakers cyst is present and it is inconclusive for a meniscus tear. Patient did receive a cortisone injection last week that helped a lot with the pain in the medial patella  Past Medical History/Comorbidities:   Ms. Gildardo Peabody  has a past medical history of Aortic regurgitation; Basal cell carcinoma (2012);  Endometriosis; Goiter; Hypothyroidism due to Hashimoto's thyroiditis; Lactose intolerance; and Osteopenia (2014). Ms. Orly Cary  has a past surgical history that includes  section; cholecystectomy; orthopaedic; heent; and breast biopsy (Bilateral, 1985). Social History/Living Environment:       Social History     Social History    Marital status:      Spouse name: N/A    Number of children: N/A    Years of education: N/A     Occupational History    Not on file. Social History Main Topics    Smoking status: Never Smoker    Smokeless tobacco: Never Used    Alcohol use 0.0 oz/week     0 Standard drinks or equivalent per week      Comment: social    Drug use: No    Sexual activity: Not Currently     Other Topics Concern    Blood Transfusions No    Caffeine Concern No    Exercise Yes     working out at gym- [employer]/wave machine, some weights, pilates, walking dog     Social History Narrative       Prior Level of Function/Work/Activity:  Independent, daily   Dominant Side:         RIGHT  Current Medications:       Current Outpatient Prescriptions:     liothyronine (CYTOMEL) 5 mcg tablet, Take 2 Tabs by mouth daily. , Disp: 180 Tab, Rfl: 3    Levothyroxine (TIROSINT) 125 mcg cap, Take 1 Cap by mouth daily. , Disp: 90 Cap, Rfl: 1    Cholecalciferol, Vitamin D3, 2,000 unit/drop drop, Take 1,000 Units by mouth., Disp: , Rfl:     cyanocobalamin 2,000 mcg TbER, Take 2,000 mcg by mouth., Disp: , Rfl:    Date Last Reviewed:  2017     Number of Personal Factors/Comorbidities that affect the Plan of Care: 1-2: MODERATE COMPLEXITY   EXAMINATION:   Observation/Orthostatic Postural Assessment:          Patient arrives with a knee brace on the left knee. She shows increased old scaring at the medial patella from surgery for patellar dislocation. Left gastroc appears slightly atrophied compared to right side, increased left knee valgus as well as slight tibial external rotation on the left.   Squat function shows slight tracking over medial big toe rather than 2nd toe. Palpation:          Patella is tilted laterally. Some scar tissue restriction, but no affecting her tracking with knee flexion and extension. Increased medial hamstring and medial gastroc tightness with pain at intersection of these two muscles. She has a harder end feel with movement of tibial on femur more with slight external rotation being the hardest.    ROM:          R LE (0-132 deg), L LE (-5 -122 deg)  Hip shows good flexion bilaterally, ER is limited more on the right than left, but still in functional range  Strength:          4+/5 for knee flexion and extension. Special Tests:          (-) ligament stress tests for medial and lateral collateral, (-) meniscus tests (compression with rotation and Laquita's)   Body Structures Involved:  1. Bones  2. Joints  3. Muscles  4. Ligaments Body Functions Affected:  1. Neuromusculoskeletal  2. Movement Related Activities and Participation Affected:  1. General Tasks and Demands  2. Mobility  3. Community, Social and [place]   Number of elements (examined above) that affect the Plan of Care: 4+: HIGH COMPLEXITY   CLINICAL PRESENTATION:   Presentation: Stable and uncomplicated: LOW COMPLEXITY   CLINICAL DECISION MAKING:   Outcome Measure: Tool Used: Lower Extremity Functional Scale (LEFS)  Score:  Initial: 40/80 Most Recent: X/80 (Date: -- )   Interpretation of Score: 20 questions each scored on a 5 point scale with 0 representing \"extreme difficulty or unable to perform\" and 4 representing \"no difficulty\". The lower the score, the greater the functional disability. 80/80 represents no disability. Minimal detectable change is 9 points. Score 80 79-63 62-48 47-32 31-16 15-1 0   Modifier CH CI CJ CK CL CM CN       Medical Necessity:   · Patient is expected to demonstrate progress in strength, range of motion, balance, coordination and functional technique to improve functional mobility.   · Patient demonstrates good rehab potential due to higher previous functional level. · Skilled intervention continues to be required due to increased pain and decreased mobility. Reason for Services/Other Comments:  · Patient continues to require skilled intervention due to increased pain. Use of outcome tool(s) and clinical judgement create a POC that gives a: Clear prediction of patient's progress: LOW COMPLEXITY            TREATMENT:   (In addition to Assessment/Re-Assessment sessions the following treatments were rendered)  Pre-treatment Symptoms/Complaints:  Patient reports that she is feeling much better this morning, after using the mini-plunger last night  Pain: Initial:   Pain Intensity 1: 1 back of the knee today Post Session:  1/10     THERAPEUTIC EXERCISE: (25 minutes):  Exercises per grid below to improve mobility, strength, balance and coordination. Required minimal visual, verbal and manual cues to promote proper body alignment, promote proper body posture and promote proper body mechanics. Progressed resistance, range and repetitions as indicated. Date:  6/29/2017     Activity/Exercise Parameters   Stretches 5 min for hamstring mobility and rectus femoris mobility  Gastroc stretch x 1 min on slant board   Hip abduction Side lied and standing x 10 each  Side step with red band 4 x 15 ft down and back   IT band stretch X 3 min   Monster walks Red band 4 x 15 feet down and back   Balance Fitter board side to side and front to back x 5 min each  Single leg hip hinge with slight knee bend 2 x 10 on left LE   Kneeling practice On R LE with push up x 2 min   Squats 2 x 15 to table height       Step up 6 inch step x 20  Step down on 4 inch 2 x 10       MANUAL THERAPY: (30 minutes): Joint mobilization and Soft tissue mobilization was utilized and necessary because of the patient's restricted joint motion and restricted motion of soft tissue.    -STM in prone to medial hamstring, medial gastroc and utilized mobilization with movement for restrictions. Tibia on femur glides for increased congruency.    -Patellar glides for decreased mobility  -HEP rectus femoris stretch, doorway hamstring stretch, gastroc stretch    Treatment/Session Assessment:    · Response to Treatment:  Patient reports feeling better after manual treatment. Improvement in sit to stand and gait today  · Compliance with Program/Exercises: compliant all of the time. · Recommendations/Intent for next treatment session: \"Next visit will focus on Soft tissue and joint mechanics\".   Total Treatment Duration:  PT Patient Time In/Time Out  Time In: 0830  Time Out: [facility], PT

## 2017-07-06 ENCOUNTER — HOSPITAL ENCOUNTER (OUTPATIENT)
Dept: PHYSICAL THERAPY | Age: 57
Discharge: HOME | End: 2017-07-06
Payer: COMMERCIAL

## 2017-07-06 PROCEDURE — 97110 THERAPEUTIC EXERCISES: CPT

## 2017-07-06 PROCEDURE — 97164 PT RE-EVAL EST PLAN CARE: CPT

## 2017-07-06 PROCEDURE — 97140 MANUAL THERAPY 1/> REGIONS: CPT

## 2017-07-06 NOTE — PROGRESS NOTES
Nelly Alexis  : 1960 [facility] at [address] [place] [address]  [phone]   [phone]          OUTPATIENT PHYSICAL THERAPY:Daily Note, Re-evaluation and Recertification [id number]    ICD-10: Treatment Diagnosis: Pain in joint, left knee, M25.562  Patellofemoral disorders, left knee, M22.2X2  Difficulty walking, not elsewhere classified, R26.2  Precautions/Allergies:   Lactose; Penicillins; and Gluten   Fall Risk Score: 0 (? 5 = High Risk)  MD Orders: Evaluate and Treat MEDICAL/REFERRING DIAGNOSIS:  Pain in left knee [M25.562]  Chondromalacia patellae, left knee [M22.42]   DATE OF ONSET: Chronic/ exacerbation for 4 weeks  REFERRING PHYSICIAN: Kash Garcia MD  RETURN PHYSICIAN APPOINTMENT: 6 weeks     INITIAL ASSESSMENT:  Nelly Alexis has been seen for 6 visits from 17 to 2017 for left knee pain. Patient has performed therapeutic exercises, activities, and had manual therapy to increased strength, ROM and function. Patient has also used modalities for pain control in order to increase function. Patient has show an increase in function per the LEFS with scores of 59/80. Patient has progressed well toward their goals and will benefit from continuing skilled PT in order to address their impairments. Heidi Pacheco, PT, DPT, OCS  2017     PROBLEM LIST (Impacting functional limitations):  1. Decreased ADL/Functional Activities  2. Decreased Transfer Abilities  3. Decreased Balance  4. Increased Pain  5. Decreased Activity Tolerance  6. Decreased Flexibility/Joint Mobility INTERVENTIONS PLANNED:  1. Balance Exercise  2. Cold  3. Electrical Stimulation  4. Heat  5. Manual Therapy  6. Neuromuscular Re-education/Strengthening  7. Range of Motion (ROM)  8. Therapeutic Activites  9. Therapeutic Exercise/Strengthening   TREATMENT PLAN:  Effective Dates: 17 TO 17.   Frequency/Duration: 1 time every other week for 4 weeks  GOALS: (Goals have been discussed and agreed upon with patient.)  Short-Term Functional Goals: Time Frame: 2 weeks  1. Patient will report a greater than 25% improvement in overall symptoms in order to return to activity (MET)  2. Patient will show a greater than 2 deg increase in left knee extension in order to progress to neutral knee extension(MET)  3. Patient will be independent in all HEP for knee and hip mobility(MET)  Discharge Goals: Time Frame: 4 weeks  4. Patient will report a greater than 50% improvement in overall symptoms in order to return to activity (MET)  5. Patient will show a greater than 5 point increase on the LEFS in order to show an increase in function. (MET)  6. Patient will return to exercises in the gym for increased function (ongoing)  7. Patient will be independent in all HEP for knee and hip mobility and knee stability (ongoing)  8. NEW GOAL:  9. Patient will return to pilates without pain increase  Rehabilitation Potential For Stated Goals: Good  Regarding Yuridia Ethan's therapy, I certify that the treatment plan above will be carried out by a therapist or under their direction. Thank you for this referral,  Len Oliva PT     Referring Physician Signature: Vimal Isaacs MD              Date                    The information in this section was collected on 6/7/17 (except where otherwise noted). HISTORY:   History of Present Injury/Illness (Reason for Referral):  Patient reports that she has had knee issues since she had the surgery in 1977 for the patella. They had kept her in a leg brace for several weeks and she still has some lack of motion back then. Since then she gets some intermittent knee pain on the inside of the patella and lately more on the back of the knee. It is exacerbated with increased stretching and in the morning more than the afternoon. I can go up stairs just fine sometimes and other times have a little trouble.   X-rays show no change in arthritis over a three year time frame, a bakers cyst is present and it is inconclusive for a meniscus tear. Patient did receive a cortisone injection last week that helped a lot with the pain in the medial patella  Past Medical History/Comorbidities:   Ms. Shilpa Moreno  has a past medical history of Aortic regurgitation; Basal cell carcinoma (); Endometriosis; Goiter; Hypothyroidism due to Hashimoto's thyroiditis; Lactose intolerance; and Osteopenia (2014). Ms. Shilpa Moreno  has a past surgical history that includes  section; cholecystectomy; orthopaedic; heent; and breast biopsy (Bilateral, ). Social History/Living Environment:       Social History     Social History    Marital status:      Spouse name: N/A    Number of children: N/A    Years of education: N/A     Occupational History    Not on file. Social History Main Topics    Smoking status: Never Smoker    Smokeless tobacco: Never Used    Alcohol use 0.0 oz/week     0 Standard drinks or equivalent per week      Comment: social    Drug use: No    Sexual activity: Not Currently     Other Topics Concern    Blood Transfusions No    Caffeine Concern No    Exercise Yes     working out at [employer]- [employer]/wave machine, some weights, pilates, walking dog     Social History Narrative       Prior Level of Function/Work/Activity:  Independent, daily   Dominant Side:         RIGHT  Current Medications:       Current Outpatient Prescriptions:     liothyronine (CYTOMEL) 5 mcg tablet, Take 2 Tabs by mouth daily. , Disp: 180 Tab, Rfl: 3    Levothyroxine (TIROSINT) 125 mcg cap, Take 1 Cap by mouth daily. , Disp: 90 Cap, Rfl: 1    Cholecalciferol, Vitamin D3, 2,000 unit/drop drop, Take 1,000 Units by mouth., Disp: , Rfl:     cyanocobalamin 2,000 mcg TbER, Take 2,000 mcg by mouth., Disp: , Rfl:    Date Last Reviewed:  2017     Number of Personal Factors/Comorbidities that affect the Plan of Care: 1-2: MODERATE COMPLEXITY   EXAMINATION: Observation/Orthostatic Postural Assessment:          Patient arrives with a knee brace on the left knee. She shows increased old scaring at the medial patella from surgery for patellar dislocation. Left gastroc appears slightly atrophied compared to right side, increased left knee valgus as well as slight tibial external rotation on the left. Squat function shows slight tracking over medial big toe rather than 2nd toe. Palpation:          Patella is tilted laterally. Some scar tissue restriction, but no affecting her tracking with knee flexion and extension. Increased medial hamstring and medial gastroc tightness with pain at intersection of these two muscles. She has a harder end feel with movement of tibial on femur more with slight external rotation being the hardest.    ROM:          R LE (0-132 deg), L LE (0 -122 deg)  Hip shows good flexion bilaterally, ER is limited more on the right than left, but still in functional range  Strength:          4+/5 for knee flexion and extension. Special Tests:          (-) ligament stress tests for medial and lateral collateral, (-) meniscus tests (compression with rotation and Laquita's)   Body Structures Involved:  1. Bones  2. Joints  3. Muscles  4. Ligaments Body Functions Affected:  1. Neuromusculoskeletal  2. Movement Related Activities and Participation Affected:  1. General Tasks and Demands  2. Mobility  3. Community, Social and [facility] [address]   Number of elements (examined above) that affect the Plan of Care: 4+: HIGH COMPLEXITY   CLINICAL PRESENTATION:   Presentation: Stable and uncomplicated: LOW COMPLEXITY   CLINICAL DECISION MAKING:   Outcome Measure: Tool Used: Lower Extremity Functional Scale (LEFS)  Score:  Initial: 40/80 Most Recent: 59/80 (Date: 7/6/17)   Interpretation of Score: 20 questions each scored on a 5 point scale with 0 representing \"extreme difficulty or unable to perform\" and 4 representing \"no difficulty\".   The lower the score, the greater the functional disability. 80/80 represents no disability. Minimal detectable change is 9 points. Score 80 79-63 62-48 47-32 31-16 15-1 0   Modifier CH CI CJ CK CL CM CN       Medical Necessity:   · Patient is expected to demonstrate progress in strength, range of motion, balance, coordination and functional technique to improve functional mobility. · Patient demonstrates good rehab potential due to higher previous functional level. · Skilled intervention continues to be required due to increased pain and decreased mobility. Reason for Services/Other Comments:  · Patient continues to require skilled intervention due to increased pain. Use of outcome tool(s) and clinical judgement create a POC that gives a: Clear prediction of patient's progress: LOW COMPLEXITY            TREATMENT:   (In addition to Assessment/Re-Assessment sessions the following treatments were rendered)  Pre-treatment Symptoms/Complaints:  Patient reports that she is feeling much better this morning, after using the mini-plunger last night  Pain: Initial:   Pain Intensity 1: 0 back of the knee today Post Session:  0/10     THERAPEUTIC EXERCISE: (15 minutes):  Exercises per grid below to improve mobility, strength, balance and coordination. Required minimal visual, verbal and manual cues to promote proper body alignment, promote proper body posture and promote proper body mechanics. Progressed resistance, range and repetitions as indicated.    Date:  7/6/2017     Activity/Exercise Parameters   Stretches 5 min for hamstring mobility and rectus femoris mobility  Gastroc stretch x 1 min on slant board   Hip abduction Side step with red band 4 x 15 ft down and back   IT band stretch X 3 min   Monster walks (not today) Red band 4 x 15 feet down and back   Balance Fitter board side to side and front to back x 5 min each  Single leg hip hinge with slight knee bend 2 x 10 on left LE   Kneeling practice On R LE with push up x 2 min   Squats 2 x 15 to table height       Step up 6 inch step x 20  Step down on 4 inch 2 x 10       MANUAL THERAPY: (15 minutes): Joint mobilization and Soft tissue mobilization was utilized and necessary because of the patient's restricted joint motion and restricted motion of soft tissue. -STM in prone to medial hamstring, medial gastroc and utilized mobilization with movement for restrictions. Tibia on femur glides for increased congruency.    -Patellar glides for decreased mobility  -HEP rectus femoris stretch, doorway hamstring stretch, gastroc stretch    Treatment/Session Assessment:    · Response to Treatment:  Patient reports feeling better after manual treatment. Functional motion is more pain free now and she is walking much better. Follow up in two weeks  · Compliance with Program/Exercises: compliant all of the time. · Recommendations/Intent for next treatment session: \"Next visit will focus on Soft tissue and joint mechanics\".   Total Treatment Duration:Re-evaluation x 15 min, treatment x 30 min  PT Patient Time In/Time Out  Time In: 9466  Time Out: 12[facility], PT

## 2017-07-18 ENCOUNTER — HOSPITAL ENCOUNTER (OUTPATIENT)
Dept: PHYSICAL THERAPY | Age: 57
Discharge: HOME | End: 2017-07-18
Payer: COMMERCIAL

## 2017-07-18 PROCEDURE — 97140 MANUAL THERAPY 1/> REGIONS: CPT

## 2017-07-18 PROCEDURE — 97110 THERAPEUTIC EXERCISES: CPT

## 2017-07-18 NOTE — PROGRESS NOTES
Piotr Pagan  : 1960 [facility] at [address] [place] [address]  [phone]   [phone]          OUTPATIENT PHYSICAL THERAPY:Daily Note 2017    ICD-10: Treatment Diagnosis: Pain in joint, left knee, M25.562  Patellofemoral disorders, left knee, M22.2X2  Difficulty walking, not elsewhere classified, R26.2  Precautions/Allergies:   Lactose; Penicillins; and Gluten   Fall Risk Score: 0 (? 5 = High Risk)  MD Orders: Evaluate and Treat MEDICAL/REFERRING DIAGNOSIS:  Pain in left knee [M25.562]  Chondromalacia patellae, left knee [M22.42]   DATE OF ONSET: Chronic/ exacerbation for 4 weeks  REFERRING PHYSICIAN: Jacob Gunderson MD  RETURN PHYSICIAN APPOINTMENT: 6 weeks     INITIAL ASSESSMENT:  Piotr Pagan has been seen for 6 visits from 17 to 2017 for left knee pain. Patient has performed therapeutic exercises, activities, and had manual therapy to increased strength, ROM and function. Patient has also used modalities for pain control in order to increase function. Patient has show an increase in function per the LEFS with scores of 59/80. Patient has progressed well toward their goals and will benefit from continuing skilled PT in order to address their impairments. Abbi Johnson, PT, DPT, OCS  2017     PROBLEM LIST (Impacting functional limitations):  1. Decreased ADL/Functional Activities  2. Decreased Transfer Abilities  3. Decreased Balance  4. Increased Pain  5. Decreased Activity Tolerance  6. Decreased Flexibility/Joint Mobility INTERVENTIONS PLANNED:  1. Balance Exercise  2. Cold  3. Electrical Stimulation  4. Heat  5. Manual Therapy  6. Neuromuscular Re-education/Strengthening  7. Range of Motion (ROM)  8. Therapeutic Activites  9. Therapeutic Exercise/Strengthening   TREATMENT PLAN:  Effective Dates: 17 TO 17.   Frequency/Duration: 1 time every other week for 4 weeks  GOALS: (Goals have been discussed and agreed upon with patient.)  Short-Term Functional Goals: Time Frame: 2 weeks  1. Patient will report a greater than 25% improvement in overall symptoms in order to return to activity (MET)  2. Patient will show a greater than 2 deg increase in left knee extension in order to progress to neutral knee extension(MET)  3. Patient will be independent in all HEP for knee and hip mobility(MET)  Discharge Goals: Time Frame: 4 weeks  4. Patient will report a greater than 50% improvement in overall symptoms in order to return to activity (MET)  5. Patient will show a greater than 5 point increase on the LEFS in order to show an increase in function. (MET)  6. Patient will return to exercises in the gym for increased function (ongoing)  7. Patient will be independent in all HEP for knee and hip mobility and knee stability (ongoing)  8. NEW GOAL:  9. Patient will return to pilates without pain increase  Rehabilitation Potential For Stated Goals: Good  Regarding Yuridia Ethan's therapy, I certify that the treatment plan above will be carried out by a therapist or under their direction. Thank you for this referral,  Len Oliva PT     Referring Physician Signature: Vimal Isaacs MD              Date                    The information in this section was collected on 6/7/17 (except where otherwise noted). HISTORY:   History of Present Injury/Illness (Reason for Referral):  Patient reports that she has had knee issues since she had the surgery in 1977 for the patella. They had kept her in a leg brace for several weeks and she still has some lack of motion back then. Since then she gets some intermittent knee pain on the inside of the patella and lately more on the back of the knee. It is exacerbated with increased stretching and in the morning more than the afternoon. I can go up stairs just fine sometimes and other times have a little trouble.   X-rays show no change in arthritis over a three year time frame, a bakers cyst is present and it is inconclusive for a meniscus tear. Patient did receive a cortisone injection last week that helped a lot with the pain in the medial patella  Past Medical History/Comorbidities:   Ms. Wally Ozuna  has a past medical history of Aortic regurgitation; Basal cell carcinoma (); Endometriosis; Goiter; Hypothyroidism due to Hashimoto's thyroiditis; Lactose intolerance; and Osteopenia (2014). Ms. Wally Ozuna  has a past surgical history that includes  section; cholecystectomy; orthopaedic; heent; and breast biopsy (Bilateral, 1985). Social History/Living Environment:       Social History     Social History    Marital status:      Spouse name: N/A    Number of children: N/A    Years of education: N/A     Occupational History    Not on file. Social History Main Topics    Smoking status: Never Smoker    Smokeless tobacco: Never Used    Alcohol use 0.0 oz/week     0 Standard drinks or equivalent per week      Comment: social    Drug use: No    Sexual activity: Not Currently     Other Topics Concern    Blood Transfusions No    Caffeine Concern No    Exercise Yes     working out at gym- [employer]/wave machine, some weights, pilates, walking dog     Social History Narrative       Prior Level of Function/Work/Activity:  Independent, daily   Dominant Side:         RIGHT  Current Medications:       Current Outpatient Prescriptions:     liothyronine (CYTOMEL) 5 mcg tablet, Take 2 Tabs by mouth daily. , Disp: 180 Tab, Rfl: 3    Levothyroxine (TIROSINT) 125 mcg cap, Take 1 Cap by mouth daily. , Disp: 90 Cap, Rfl: 1    Cholecalciferol, Vitamin D3, 2,000 unit/drop drop, Take 1,000 Units by mouth., Disp: , Rfl:     cyanocobalamin 2,000 mcg TbER, Take 2,000 mcg by mouth., Disp: , Rfl:    Date Last Reviewed:  2017     Number of Personal Factors/Comorbidities that affect the Plan of Care: 1-2: MODERATE COMPLEXITY   EXAMINATION:   Observation/Orthostatic Postural Assessment: Patient arrives with a knee brace on the left knee. She shows increased old scaring at the medial patella from surgery for patellar dislocation. Left gastroc appears slightly atrophied compared to right side, increased left knee valgus as well as slight tibial external rotation on the left. Squat function shows slight tracking over medial big toe rather than 2nd toe. Palpation:          Patella is tilted laterally. Some scar tissue restriction, but no affecting her tracking with knee flexion and extension. Increased medial hamstring and medial gastroc tightness with pain at intersection of these two muscles. She has a harder end feel with movement of tibial on femur more with slight external rotation being the hardest.    ROM:          R LE (0-132 deg), L LE (0 -122 deg)  Hip shows good flexion bilaterally, ER is limited more on the right than left, but still in functional range  Strength:          4+/5 for knee flexion and extension. Special Tests:          (-) ligament stress tests for medial and lateral collateral, (-) meniscus tests (compression with rotation and Laquita's)   Body Structures Involved:  1. Bones  2. Joints  3. Muscles  4. Ligaments Body Functions Affected:  1. Neuromusculoskeletal  2. Movement Related Activities and Participation Affected:  1. General Tasks and Demands  2. Mobility  3. Community, Social and [place]   Number of elements (examined above) that affect the Plan of Care: 4+: HIGH COMPLEXITY   CLINICAL PRESENTATION:   Presentation: Stable and uncomplicated: LOW COMPLEXITY   CLINICAL DECISION MAKING:   Outcome Measure: Tool Used: Lower Extremity Functional Scale (LEFS)  Score:  Initial: 40/80 Most Recent: 59/80 (Date: 7/6/17)   Interpretation of Score: 20 questions each scored on a 5 point scale with 0 representing \"extreme difficulty or unable to perform\" and 4 representing \"no difficulty\". The lower the score, the greater the functional disability.  80/80 represents no disability. Minimal detectable change is 9 points. Score 80 79-63 62-48 47-32 31-16 15-1 0   Modifier CH CI CJ CK CL CM CN       Medical Necessity:   · Patient is expected to demonstrate progress in strength, range of motion, balance, coordination and functional technique to improve functional mobility. · Patient demonstrates good rehab potential due to higher previous functional level. · Skilled intervention continues to be required due to increased pain and decreased mobility. Reason for Services/Other Comments:  · Patient continues to require skilled intervention due to increased pain. Use of outcome tool(s) and clinical judgement create a POC that gives a: Clear prediction of patient's progress: LOW COMPLEXITY            TREATMENT:   (In addition to Assessment/Re-Assessment sessions the following treatments were rendered)  Pre-treatment Symptoms/Complaints:  Patient reports that she is still improving with the knee and some feeling like she just can't bend it that far or that it gets stuck  Pain: Initial:   Pain Intensity 1: 0 back of the knee today Post Session:  0/10     THERAPEUTIC EXERCISE: (15 minutes):  Exercises per grid below to improve mobility, strength, balance and coordination. Required minimal visual, verbal and manual cues to promote proper body alignment, promote proper body posture and promote proper body mechanics. Progressed resistance, range and repetitions as indicated.    Date:  7/18/2017     Activity/Exercise Parameters   Stretches 5 min for hamstring mobility and rectus femoris mobility  Gastroc stretch x 1 min on slant board   Hip abduction Side step with red band 4 x 15 ft down and back   IT band stretch X 3 min   Monster walks (not today) Red band 4 x 15 feet down and back   Balance Fitter board side to side and front to back x 5 min each  Single leg hip hinge with slight knee bend 2 x 10 on left LE   Kneeling practice (not today) On R LE with push up x 2 min Squats (reviewed) 2 x 15 to table height       Step up(reviewed) 6 inch step x 20  Step down on 4 inch 2 x 10   Hamstring curls Seated with green band x 20   Knee extension Seated with green band x 30       MANUAL THERAPY: (25 minutes): Joint mobilization and Soft tissue mobilization was utilized and necessary because of the patient's restricted joint motion and restricted motion of soft tissue. -STM in prone to medial hamstring, medial gastroc and utilized mobilization with movement for restrictions. Tibia on femur glides for increased congruency.    -Patellar glides for decreased mobility  -HEP rectus femoris stretch, doorway hamstring stretch, gastroc stretch    Treatment/Session Assessment:    · Response to Treatment:  Patient reports feeling better after manual treatment. Follow up in two weeks and continue strength around joint  · Compliance with Program/Exercises: compliant all of the time. · Recommendations/Intent for next treatment session: \"Next visit will focus on Soft tissue and joint mechanics\".   Total Treatment Duration:  PT Patient Time In/Time Out  Time In: 1256  Time Out: 601 Angelia De Guzman, PT

## 2017-07-31 ENCOUNTER — HOSPITAL ENCOUNTER (OUTPATIENT)
Dept: PHYSICAL THERAPY | Age: 57
Discharge: HOME | End: 2017-07-31
Payer: COMMERCIAL

## 2017-07-31 PROCEDURE — 97110 THERAPEUTIC EXERCISES: CPT

## 2017-07-31 PROCEDURE — 97140 MANUAL THERAPY 1/> REGIONS: CPT

## 2017-07-31 PROCEDURE — 97164 PT RE-EVAL EST PLAN CARE: CPT

## 2017-07-31 NOTE — PROGRESS NOTES
Amy Peterson  : 1960 [facility] at [address] [place] [address]  [phone]   [phone]          OUTPATIENT PHYSICAL THERAPY:Daily Note, Re-evaluation and Recertification     ICD-10: Treatment Diagnosis: Pain in joint, left knee, M25.562  Patellofemoral disorders, left knee, M22.2X2  Difficulty walking, not elsewhere classified, R26.2  Precautions/Allergies:   Lactose; Penicillins; and Gluten   Fall Risk Score: 0 (? 5 = High Risk)  MD Orders: Evaluate and Treat MEDICAL/REFERRING DIAGNOSIS:  Pain in left knee [M25.562]  Chondromalacia patellae, left knee [M22.42]   DATE OF ONSET: Chronic/ exacerbation for 4 weeks  REFERRING PHYSICIAN: Abdi Nava MD  RETURN PHYSICIAN APPOINTMENT: 6 weeks     INITIAL ASSESSMENT:  Amy Peterson has been seen for 8 visits from 17 to 2017 for left knee pain. Patient has performed therapeutic exercises, activities, and had manual therapy to increased strength, ROM and function. Patient has also used modalities for pain control in order to increase function. Patient has show an increase in function per the LEFS with scores of 51/80. Patient has progressed well toward their goals and will benefit from continuing skilled PT in order to address their impairments. Geraldine Adamson, PT, DPT, OCS  2017     PROBLEM LIST (Impacting functional limitations):  1. Decreased ADL/Functional Activities  2. Decreased Transfer Abilities  3. Decreased Balance  4. Increased Pain  5. Decreased Activity Tolerance  6. Decreased Flexibility/Joint Mobility INTERVENTIONS PLANNED:  1. Balance Exercise  2. Cold  3. Electrical Stimulation  4. Heat  5. Manual Therapy  6. Neuromuscular Re-education/Strengthening  7. Range of Motion (ROM)  8. Therapeutic Activites  9. Therapeutic Exercise/Strengthening   TREATMENT PLAN:  Effective Dates: 17 TO 17.   Frequency/Duration: 1 time every other week for 4 weeks  GOALS: (Goals have been discussed and agreed upon with patient.)  1. Discharge Goals: Time Frame: 4 weeks  2. Patient will report a greater than 50% improvement in overall symptoms in order to return to activity (MET)  3. Patient will show a greater than 5 point increase on the LEFS in order to show an increase in function. (MET)  4. Patient will return to exercises in the gym for increased function (ongoing)  5. Patient will be independent in all HEP for knee and hip mobility and knee stability (ongoing)  6. NEW GOAL:  7. Patient will return to pilates without pain increase  Rehabilitation Potential For Stated Goals: Good  Regarding Yuridia Long's therapy, I certify that the treatment plan above will be carried out by a therapist or under their direction. Thank you for this referral,  Tiffanie Stanton PT     Referring Physician Signature: Derenda Schirmer, MD              Date                    The information in this section was collected on 6/7/17 (except where otherwise noted). HISTORY:   History of Present Injury/Illness (Reason for Referral):  Patient reports that she has had knee issues since she had the surgery in 1977 for the patella. They had kept her in a leg brace for several weeks and she still has some lack of motion back then. Since then she gets some intermittent knee pain on the inside of the patella and lately more on the back of the knee. It is exacerbated with increased stretching and in the morning more than the afternoon. I can go up stairs just fine sometimes and other times have a little trouble. X-rays show no change in arthritis over a three year time frame, a bakers cyst is present and it is inconclusive for a meniscus tear. Patient did receive a cortisone injection last week that helped a lot with the pain in the medial patella  Past Medical History/Comorbidities:   Ms. Buster Santiago  has a past medical history of Aortic regurgitation; Basal cell carcinoma (2012);  Endometriosis; Goiter; Hypothyroidism due to Hashimoto's thyroiditis; Lactose intolerance; and Osteopenia (2014). Ms. Joycelyn Damon  has a past surgical history that includes  section; cholecystectomy; orthopaedic; heent; and breast biopsy (Bilateral, 1985). Social History/Living Environment:       Social History     Social History    Marital status:      Spouse name: N/A    Number of children: N/A    Years of education: N/A     Occupational History    Not on file. Social History Main Topics    Smoking status: Never Smoker    Smokeless tobacco: Never Used    Alcohol use 0.0 oz/week     0 Standard drinks or equivalent per week      Comment: social    Drug use: No    Sexual activity: Not Currently     Other Topics Concern    Blood Transfusions No    Caffeine Concern No    Exercise Yes     working out at gym- [employer]/wave machine, some weights, pilates, walking dog     Social History Narrative       Prior Level of Function/Work/Activity:  Independent, daily   Dominant Side:         RIGHT  Current Medications:       Current Outpatient Prescriptions:     liothyronine (CYTOMEL) 5 mcg tablet, Take 2 Tabs by mouth daily. , Disp: 180 Tab, Rfl: 3    Levothyroxine (TIROSINT) 125 mcg cap, Take 1 Cap by mouth daily. , Disp: 90 Cap, Rfl: 1    Cholecalciferol, Vitamin D3, 2,000 unit/drop drop, Take 1,000 Units by mouth., Disp: , Rfl:     cyanocobalamin 2,000 mcg TbER, Take 2,000 mcg by mouth., Disp: , Rfl:    Date Last Reviewed:  2017     Number of Personal Factors/Comorbidities that affect the Plan of Care: 1-2: MODERATE COMPLEXITY   EXAMINATION:   Observation/Orthostatic Postural Assessment:          Patient arrives with a knee brace on the left knee. She shows increased old scaring at the medial patella from surgery for patellar dislocation. Left gastroc appears slightly atrophied compared to right side, increased left knee valgus as well as slight tibial external rotation on the left.   Squat function shows slight tracking over medial big toe rather than 2nd toe. Palpation:          Patella is tilted laterally. Some scar tissue restriction, but no affecting her tracking with knee flexion and extension. Increased medial hamstring and medial gastroc tightness with pain at intersection of these two muscles. She has a harder end feel with movement of tibial on femur more with slight external rotation being the hardest.    ROM:          R LE (0-132 deg), L LE (0 -122 deg)  Hip shows good flexion bilaterally, ER is limited more on the right than left, but still in functional range  Strength:          4+/5 for knee flexion and extension. Special Tests:          (-) ligament stress tests for medial and lateral collateral, (-) meniscus tests (compression with rotation and Laquita's)   Body Structures Involved:  1. Bones  2. Joints  3. Muscles  4. Ligaments Body Functions Affected:  1. Neuromusculoskeletal  2. Movement Related Activities and Participation Affected:  1. General Tasks and Demands  2. Mobility  3. Community, Social and [place]   Number of elements (examined above) that affect the Plan of Care: 4+: HIGH COMPLEXITY   CLINICAL PRESENTATION:   Presentation: Stable and uncomplicated: LOW COMPLEXITY   CLINICAL DECISION MAKING:   Outcome Measure: Tool Used: Lower Extremity Functional Scale (LEFS)  Score:  Initial: 40/80 Most Recent: 51/80 (Date: 7/31/17)   Interpretation of Score: 20 questions each scored on a 5 point scale with 0 representing \"extreme difficulty or unable to perform\" and 4 representing \"no difficulty\". The lower the score, the greater the functional disability. 80/80 represents no disability. Minimal detectable change is 9 points.   Score 80 79-63 62-48 47-32 31-16 15-1 0   Modifier CH CI CJ CK CL CM CN       Medical Necessity:   · Patient is expected to demonstrate progress in strength, range of motion, balance, coordination and functional technique to improve functional mobility. · Patient demonstrates good rehab potential due to higher previous functional level. · Skilled intervention continues to be required due to increased pain and decreased mobility. Reason for Services/Other Comments:  · Patient continues to require skilled intervention due to increased pain. Use of outcome tool(s) and clinical judgement create a POC that gives a: Clear prediction of patient's progress: LOW COMPLEXITY            TREATMENT:   (In addition to Assessment/Re-Assessment sessions the following treatments were rendered)  Pre-treatment Symptoms/Complaints:  Patient reports that the slight pain is moving around and just feels tight from time to time with some swelling still on the outside of the knee  Pain: Initial:   Pain Intensity 1: 1 back of the knee today Post Session:  0/10     THERAPEUTIC EXERCISE: (15 minutes):  Exercises per grid below to improve mobility, strength, balance and coordination. Required minimal visual, verbal and manual cues to promote proper body alignment, promote proper body posture and promote proper body mechanics. Progressed resistance, range and repetitions as indicated.    Date:  7/31/2017     Activity/Exercise Parameters   Stretches 5 min for hamstring mobility and rectus femoris mobility  Gastroc stretch x 1 min on slant board   Hip abduction Side step with red band 4 x 15 ft down and back   IT band stretch X 3 min   Monster walks (not today) Red band 4 x 15 feet down and back   Balance Fitter board side to side and front to back x 5 min each  Single leg hip hinge with slight knee bend 2 x 10 on left LE   Kneeling practice (not today) On R LE with push up x 2 min   Squats (reviewed) 2 x 15 to table height       Step up 6 inch step x 20  Step down on 4 inch 2 x 10   Hamstring curls Seated with green band x 20   Knee extension Seated with green band x 30       MANUAL THERAPY: (20 minutes): Joint mobilization and Soft tissue mobilization was utilized and necessary because of the patient's restricted joint motion and restricted motion of soft tissue. -STM in prone to medial hamstring, medial gastroc and utilized mobilization with movement for restrictions. Tibia on femur glides for increased congruency.    -Patellar glides for decreased mobility  -HEP rectus femoris stretch, doorway hamstring stretch, gastroc stretch    Treatment/Session Assessment:    · Response to Treatment:  Patient reports feeling better after manual treatment. Follow up after patient returns from vacation  · Compliance with Program/Exercises: compliant all of the time. · Recommendations/Intent for next treatment session: \"Next visit will focus on Soft tissue and joint mechanics\".   Total Treatment Duration:  PT Patient Time In/Time Out  Time In: 0830  Time Out: [address] [facility], PT

## 2017-08-21 ENCOUNTER — HOSPITAL ENCOUNTER (OUTPATIENT)
Dept: PHYSICAL THERAPY | Age: 57
Discharge: HOME | End: 2017-08-21
Payer: COMMERCIAL

## 2017-08-21 PROCEDURE — 97140 MANUAL THERAPY 1/> REGIONS: CPT

## 2017-08-21 PROCEDURE — 97110 THERAPEUTIC EXERCISES: CPT

## 2017-08-21 NOTE — PROGRESS NOTES
Karen Marquez  : 1960 [facility] at [address] [place] [address]  [phone]   [phone]          OUTPATIENT PHYSICAL THERAPY:Daily Note and Discharge 2017    ICD-10: Treatment Diagnosis: Pain in joint, left knee, M25.562  Patellofemoral disorders, left knee, M22.2X2  Difficulty walking, not elsewhere classified, R26.2  Precautions/Allergies:   Lactose; Penicillins; and Gluten   Fall Risk Score: 0 (? 5 = High Risk)  MD Orders: Evaluate and Treat MEDICAL/REFERRING DIAGNOSIS:  Pain in left knee [M25.562]  Chondromalacia patellae, left knee [M22.42]   DATE OF ONSET: Chronic/ exacerbation for 4 weeks  REFERRING PHYSICIAN: Vimal Isaacs MD  RETURN PHYSICIAN APPOINTMENT: 6 weeks     INITIAL ASSESSMENT:  Karen Marquez has been seen for 8 visits from 17 to 2017 for left knee pain. Patient has performed therapeutic exercises, activities, and had manual therapy to increased strength, ROM and function. Patient has also used modalities for pain control in order to increase function. Patient has show an increase in function per the LEFS with scores of 58/80. Patient has met her goals for therapy at this time and will be discharged at this time. Please re-order therapy if further is needed. Thank you for the referral.  Melvina Saleem, PT, DPT, OCS  2017     PROBLEM LIST (Impacting functional limitations):  1. Decreased ADL/Functional Activities  2. Decreased Transfer Abilities  3. Decreased Balance  4. Increased Pain  5. Decreased Activity Tolerance  6. Decreased Flexibility/Joint Mobility INTERVENTIONS PLANNED:  1. Balance Exercise  2. Cold  3. Electrical Stimulation  4. Heat  5. Manual Therapy  6. Neuromuscular Re-education/Strengthening  7. Range of Motion (ROM)  8. Therapeutic Activites  9. Therapeutic Exercise/Strengthening   TREATMENT PLAN:  Effective Dates: 17 TO 17.   Frequency/Duration: 1 time every other week for 4 weeks  GOALS: (Goals have been discussed and agreed upon with patient.)  1. Discharge Goals: Time Frame: 4 weeks  2. Patient will report a greater than 50% improvement in overall symptoms in order to return to activity (MET)  3. Patient will show a greater than 5 point increase on the LEFS in order to show an increase in function. (MET)  4. Patient will return to exercises in the gym for increased function (MET)  5. Patient will be independent in all HEP for knee and hip mobility and knee stability (MET)  6. NEW GOAL:  7. Patient will return to pilates without pain increase  Rehabilitation Potential For Stated Goals: Good  Regarding Yuridia Long's therapy, I certify that the treatment plan above will be carried out by a therapist or under their direction. Thank you for this referral,  Elizabeth Espino PT     Referring Physician Signature: Lloyd Maynard MD              Date                    The information in this section was collected on 6/7/17 (except where otherwise noted). HISTORY:   History of Present Injury/Illness (Reason for Referral):  Patient reports that she has had knee issues since she had the surgery in 1977 for the patella. They had kept her in a leg brace for several weeks and she still has some lack of motion back then. Since then she gets some intermittent knee pain on the inside of the patella and lately more on the back of the knee. It is exacerbated with increased stretching and in the morning more than the afternoon. I can go up stairs just fine sometimes and other times have a little trouble. X-rays show no change in arthritis over a three year time frame, a bakers cyst is present and it is inconclusive for a meniscus tear. Patient did receive a cortisone injection last week that helped a lot with the pain in the medial patella  Past Medical History/Comorbidities:   Ms. Lyly Phillips  has a past medical history of Aortic regurgitation; Basal cell carcinoma (2012);  Endometriosis; Goiter; Hypothyroidism due to Hashimoto's thyroiditis; Lactose intolerance; and Osteopenia (2014). Ms. Shilpa Moreno  has a past surgical history that includes  section; cholecystectomy; orthopaedic; heent; and breast biopsy (Bilateral, 1985). Social History/Living Environment:       Social History     Social History    Marital status:      Spouse name: N/A    Number of children: N/A    Years of education: N/A     Occupational History    Not on file. Social History Main Topics    Smoking status: Never Smoker    Smokeless tobacco: Never Used    Alcohol use 0.0 oz/week     0 Standard drinks or equivalent per week      Comment: social    Drug use: No    Sexual activity: Not Currently     Other Topics Concern    Blood Transfusions No    Caffeine Concern No    Exercise Yes     working out at gym- [employer]/wave machine, some weights, pilates, walking dog     Social History Narrative       Prior Level of Function/Work/Activity:  Independent, daily   Dominant Side:         RIGHT  Current Medications:       Current Outpatient Prescriptions:     liothyronine (CYTOMEL) 5 mcg tablet, Take 2 Tabs by mouth daily. , Disp: 180 Tab, Rfl: 3    Levothyroxine (TIROSINT) 125 mcg cap, Take 1 Cap by mouth daily. , Disp: 90 Cap, Rfl: 1    Cholecalciferol, Vitamin D3, 2,000 unit/drop drop, Take 1,000 Units by mouth., Disp: , Rfl:     cyanocobalamin 2,000 mcg TbER, Take 2,000 mcg by mouth., Disp: , Rfl:    Date Last Reviewed:  2017     Number of Personal Factors/Comorbidities that affect the Plan of Care: 1-2: MODERATE COMPLEXITY   EXAMINATION:   Observation/Orthostatic Postural Assessment:          Patient arrives with a knee brace on the left knee. She shows increased old scaring at the medial patella from surgery for patellar dislocation. Left gastroc appears slightly atrophied compared to right side, increased left knee valgus as well as slight tibial external rotation on the left.   Squat function shows slight tracking over medial big toe rather than 2nd toe. Palpation:          Patella is tilted laterally. Some scar tissue restriction, but no affecting her tracking with knee flexion and extension. Increased medial hamstring and medial gastroc tightness with pain at intersection of these two muscles. She has a harder end feel with movement of tibial on femur more with slight external rotation being the hardest.    ROM:          R LE (0-132 deg), L LE (0 -128 deg)  Hip shows good flexion bilaterally, ER is limited more on the right than left, but still in functional range  Strength:          4+/5 for knee flexion and extension. Special Tests:          (-) ligament stress tests for medial and lateral collateral, (-) meniscus tests (compression with rotation and Laquita's)   Body Structures Involved:  1. Bones  2. Joints  3. Muscles  4. Ligaments Body Functions Affected:  1. Neuromusculoskeletal  2. Movement Related Activities and Participation Affected:  1. General Tasks and Demands  2. Mobility  3. Community, Social and [place]   Number of elements (examined above) that affect the Plan of Care: 4+: HIGH COMPLEXITY   CLINICAL PRESENTATION:   Presentation: Stable and uncomplicated: LOW COMPLEXITY   CLINICAL DECISION MAKING:   Outcome Measure: Tool Used: Lower Extremity Functional Scale (LEFS)  Score:  Initial: 40/80 Most Recent: 58/80 (Date: 8/21/17)   Interpretation of Score: 20 questions each scored on a 5 point scale with 0 representing \"extreme difficulty or unable to perform\" and 4 representing \"no difficulty\". The lower the score, the greater the functional disability. 80/80 represents no disability. Minimal detectable change is 9 points.   Score 80 79-63 62-48 47-32 31-16 15-1 0   Modifier CH CI CJ CK CL CM CN       Medical Necessity:   · Patient is expected to demonstrate progress in strength, range of motion, balance, coordination and functional technique to improve functional mobility. · Patient demonstrates good rehab potential due to higher previous functional level. · Skilled intervention continues to be required due to increased pain and decreased mobility. Reason for Services/Other Comments:  · Patient continues to require skilled intervention due to increased pain. Use of outcome tool(s) and clinical judgement create a POC that gives a: Clear prediction of patient's progress: LOW COMPLEXITY            TREATMENT:   (In addition to Assessment/Re-Assessment sessions the following treatments were rendered)  Pre-treatment Symptoms/Complaints:  Patient reports that her knee did great during the trip even with the amount of steps and stairs that she did. She does feel like it keeps moving around the knee and other places and that she just wants a set of exercises for the body that will keep her healthier  Pain: Initial:   Pain Intensity 1: 1 back of the knee today Post Session:  0/10     THERAPEUTIC EXERCISE: (15 minutes):  Exercises per grid below to improve mobility, strength, balance and coordination. Required minimal visual, verbal and manual cues to promote proper body alignment, promote proper body posture and promote proper body mechanics. Progressed resistance, range and repetitions as indicated.    Date:  8/21/2017     Activity/Exercise Parameters   Stretches 5 min for hamstring mobility and rectus femoris mobility  Gastroc stretch x 1 min on slant board   Hip abduction Side step with red band 4 x 15 ft down and back   IT band stretch X 3 min   Monster walks Red band 4 x 15 feet down and back   Balance Fitter board side to side and front to back x 5 min each  Single leg hip hinge with slight knee bend 2 x 10 on left LE   Kneeling practice (not today) On R LE with push up x 2 min   Squats (reviewed) 2 x 15 on wall       Step up 6 inch step x 20  Step down on 4 inch 2 x 10   Hamstring curls Seated with green band x 20   Knee extension Seated with green band x 30       MANUAL THERAPY: (25 minutes): Joint mobilization and Soft tissue mobilization was utilized and necessary because of the patient's restricted joint motion and restricted motion of soft tissue. -STM in prone to medial hamstring, medial gastroc and utilized mobilization with movement for restrictions. Tibia on femur glides for increased congruency.    -Patellar glides for decreased mobility  -HEP rectus femoris stretch, doorway hamstring stretch, gastroc stretch    Treatment/Session Assessment:    · Response to Treatment:  Patient shows overall improved motion and function with occasional pain. She is independent in all HEP for knee and hip stability. She will be discharged at this time.     Total Treatment Duration:  PT Patient Time In/Time Out  Time In: 0830  Time Out: [address], PT

## 2017-08-28 ENCOUNTER — APPOINTMENT (RX ONLY)
Dept: URBAN - METROPOLITAN AREA CLINIC 24 | Facility: CLINIC | Age: 57
Setting detail: DERMATOLOGY
End: 2017-08-28

## 2017-08-28 DIAGNOSIS — L81.4 OTHER MELANIN HYPERPIGMENTATION: ICD-10-CM

## 2017-08-28 DIAGNOSIS — L82.1 OTHER SEBORRHEIC KERATOSIS: ICD-10-CM

## 2017-08-28 DIAGNOSIS — Z85.828 PERSONAL HISTORY OF OTHER MALIGNANT NEOPLASM OF SKIN: ICD-10-CM

## 2017-08-28 DIAGNOSIS — Z71.89 OTHER SPECIFIED COUNSELING: ICD-10-CM

## 2017-08-28 DIAGNOSIS — D22 MELANOCYTIC NEVI: ICD-10-CM

## 2017-08-28 PROBLEM — D22.5 MELANOCYTIC NEVI OF TRUNK: Status: ACTIVE | Noted: 2017-08-28

## 2017-08-28 PROBLEM — D22.71 MELANOCYTIC NEVI OF RIGHT LOWER LIMB, INCLUDING HIP: Status: ACTIVE | Noted: 2017-08-28

## 2017-08-28 PROBLEM — E03.9 HYPOTHYROIDISM, UNSPECIFIED: Status: ACTIVE | Noted: 2017-08-28

## 2017-08-28 PROBLEM — L70.0 ACNE VULGARIS: Status: ACTIVE | Noted: 2017-08-28

## 2017-08-28 PROCEDURE — ? COUNSELING

## 2017-08-28 PROCEDURE — 99214 OFFICE O/P EST MOD 30 MIN: CPT

## 2017-08-28 ASSESSMENT — LOCATION DETAILED DESCRIPTION DERM
LOCATION DETAILED: LEFT INFERIOR MEDIAL MIDBACK
LOCATION DETAILED: INFERIOR THORACIC SPINE
LOCATION DETAILED: RIGHT POSTERIOR SHOULDER
LOCATION DETAILED: LEFT POSTERIOR SHOULDER
LOCATION DETAILED: LEFT SUPERIOR CENTRAL MALAR CHEEK
LOCATION DETAILED: RIGHT DISTAL CALF
LOCATION DETAILED: LEFT DISTAL PRETIBIAL REGION

## 2017-08-28 ASSESSMENT — LOCATION SIMPLE DESCRIPTION DERM
LOCATION SIMPLE: LEFT PRETIBIAL REGION
LOCATION SIMPLE: RIGHT CALF
LOCATION SIMPLE: LEFT LOWER BACK
LOCATION SIMPLE: LEFT SHOULDER
LOCATION SIMPLE: LEFT CHEEK
LOCATION SIMPLE: RIGHT SHOULDER
LOCATION SIMPLE: UPPER BACK

## 2017-08-28 ASSESSMENT — LOCATION ZONE DERM
LOCATION ZONE: LEG
LOCATION ZONE: TRUNK
LOCATION ZONE: ARM
LOCATION ZONE: FACE

## 2018-08-06 ENCOUNTER — HOSPITAL ENCOUNTER (OUTPATIENT)
Dept: MAMMOGRAPHY | Age: 58
Discharge: HOME | End: 2018-08-06
Attending: FAMILY MEDICINE
Payer: COMMERCIAL

## 2018-08-06 DIAGNOSIS — N64.4 MASTALGIA: ICD-10-CM

## 2018-08-06 PROCEDURE — 76642 ULTRASOUND BREAST LIMITED: CPT

## 2018-08-06 PROCEDURE — 77066 DX MAMMO INCL CAD BI: CPT

## 2018-08-28 ENCOUNTER — APPOINTMENT (RX ONLY)
Dept: URBAN - METROPOLITAN AREA CLINIC 23 | Facility: CLINIC | Age: 58
Setting detail: DERMATOLOGY
End: 2018-08-28

## 2018-08-28 DIAGNOSIS — Z71.89 OTHER SPECIFIED COUNSELING: ICD-10-CM

## 2018-08-28 DIAGNOSIS — Z85.828 PERSONAL HISTORY OF OTHER MALIGNANT NEOPLASM OF SKIN: ICD-10-CM

## 2018-08-28 DIAGNOSIS — D485 NEOPLASM OF UNCERTAIN BEHAVIOR OF SKIN: ICD-10-CM

## 2018-08-28 DIAGNOSIS — D22 MELANOCYTIC NEVI: ICD-10-CM

## 2018-08-28 DIAGNOSIS — L81.4 OTHER MELANIN HYPERPIGMENTATION: ICD-10-CM

## 2018-08-28 PROBLEM — D22.71 MELANOCYTIC NEVI OF RIGHT LOWER LIMB, INCLUDING HIP: Status: ACTIVE | Noted: 2018-08-28

## 2018-08-28 PROBLEM — D48.5 NEOPLASM OF UNCERTAIN BEHAVIOR OF SKIN: Status: ACTIVE | Noted: 2018-08-28

## 2018-08-28 PROBLEM — D22.5 MELANOCYTIC NEVI OF TRUNK: Status: ACTIVE | Noted: 2018-08-28

## 2018-08-28 PROCEDURE — 99214 OFFICE O/P EST MOD 30 MIN: CPT | Mod: 25

## 2018-08-28 PROCEDURE — ? COUNSELING

## 2018-08-28 PROCEDURE — ? SHAVE REMOVAL

## 2018-08-28 PROCEDURE — 11301 SHAVE SKIN LESION 0.6-1.0 CM: CPT

## 2018-08-28 ASSESSMENT — LOCATION SIMPLE DESCRIPTION DERM
LOCATION SIMPLE: LEFT LOWER BACK
LOCATION SIMPLE: RIGHT POSTERIOR UPPER ARM
LOCATION SIMPLE: RIGHT CALF
LOCATION SIMPLE: RIGHT SHOULDER
LOCATION SIMPLE: LEFT PRETIBIAL REGION
LOCATION SIMPLE: UPPER BACK
LOCATION SIMPLE: LEFT SHOULDER

## 2018-08-28 ASSESSMENT — LOCATION DETAILED DESCRIPTION DERM
LOCATION DETAILED: RIGHT POSTERIOR SHOULDER
LOCATION DETAILED: RIGHT PROXIMAL POSTERIOR UPPER ARM
LOCATION DETAILED: LEFT DISTAL PRETIBIAL REGION
LOCATION DETAILED: RIGHT DISTAL CALF
LOCATION DETAILED: LEFT INFERIOR MEDIAL MIDBACK
LOCATION DETAILED: INFERIOR THORACIC SPINE
LOCATION DETAILED: LEFT POSTERIOR SHOULDER

## 2018-08-28 ASSESSMENT — LOCATION ZONE DERM
LOCATION ZONE: LEG
LOCATION ZONE: TRUNK
LOCATION ZONE: ARM

## 2018-08-28 NOTE — PROCEDURE: SHAVE REMOVAL
Notification Instructions: Patient will be notified of biopsy results. However, patient instructed to call the office if not contacted within 2 weeks.
Medical Necessity Clause: This procedure was medically necessary because the lesion that was treated was:
X Size Of Lesion In Cm (Optional): 0
Bill [id number] For Specimen Handling/Conveyance To Laboratory?: no
Post-Care Instructions: I reviewed with the patient in detail post-care instructions. Patient is to keep the biopsy site dry overnight, and then apply bacitracin twice daily until healed. Patient may apply hydrogen peroxide soaks to remove any crusting.
Detail Level: Detailed
Was A Bandage Applied: Yes
Biopsy Method: Dermablade
Billing Type: Third-Party Bill
Anesthesia Type: 1% lidocaine with epinephrine
Size Of Lesion In Cm (Required): 0.6
Wound Care: Petrolatum
Anesthesia Volume In Cc: 1.5
Medical Necessity Information: It is in your best interest to select a reason for this procedure from the list below. All of these items fulfill various CMS LCD requirements except the new and changing color options.
Consent was obtained from the patient. The risks and benefits to therapy were discussed in detail. Specifically, the risks of infection, scarring, bleeding, prolonged wound healing, incomplete removal, allergy to anesthesia, nerve injury and recurrence were addressed. Prior to the procedure, the treatment site was clearly identified and confirmed by the patient.
Path Notes (To The Dermatopathologist): Please check margins.
Hemostasis: Electrocautery

## 2019-03-19 ENCOUNTER — HOSPITAL ENCOUNTER (OUTPATIENT)
Dept: LAB | Age: 59
Discharge: HOME | End: 2019-03-19

## 2019-03-19 PROCEDURE — 88312 SPECIAL STAINS GROUP 1: CPT

## 2019-03-19 PROCEDURE — 88305 TISSUE EXAM BY PATHOLOGIST: CPT

## 2019-08-22 ENCOUNTER — APPOINTMENT (RX ONLY)
Dept: URBAN - METROPOLITAN AREA CLINIC 23 | Facility: CLINIC | Age: 59
Setting detail: DERMATOLOGY
End: 2019-08-22

## 2019-08-22 DIAGNOSIS — L81.4 OTHER MELANIN HYPERPIGMENTATION: ICD-10-CM

## 2019-08-22 DIAGNOSIS — L82.1 OTHER SEBORRHEIC KERATOSIS: ICD-10-CM

## 2019-08-22 DIAGNOSIS — Z85.828 PERSONAL HISTORY OF OTHER MALIGNANT NEOPLASM OF SKIN: ICD-10-CM

## 2019-08-22 DIAGNOSIS — D22 MELANOCYTIC NEVI: ICD-10-CM

## 2019-08-22 DIAGNOSIS — Z71.89 OTHER SPECIFIED COUNSELING: ICD-10-CM

## 2019-08-22 PROBLEM — D22.71 MELANOCYTIC NEVI OF RIGHT LOWER LIMB, INCLUDING HIP: Status: ACTIVE | Noted: 2019-08-22

## 2019-08-22 PROBLEM — D22.61 MELANOCYTIC NEVI OF RIGHT UPPER LIMB, INCLUDING SHOULDER: Status: ACTIVE | Noted: 2019-08-22

## 2019-08-22 PROBLEM — D22.72 MELANOCYTIC NEVI OF LEFT LOWER LIMB, INCLUDING HIP: Status: ACTIVE | Noted: 2019-08-22

## 2019-08-22 PROBLEM — D22.5 MELANOCYTIC NEVI OF TRUNK: Status: ACTIVE | Noted: 2019-08-22

## 2019-08-22 PROBLEM — D22.62 MELANOCYTIC NEVI OF LEFT UPPER LIMB, INCLUDING SHOULDER: Status: ACTIVE | Noted: 2019-08-22

## 2019-08-22 PROCEDURE — 99214 OFFICE O/P EST MOD 30 MIN: CPT | Mod: 25

## 2019-08-22 PROCEDURE — 11300 SHAVE SKIN LESION 0.5 CM/<: CPT

## 2019-08-22 PROCEDURE — ? SHAVE REMOVAL

## 2019-08-22 PROCEDURE — ? COUNSELING

## 2019-08-22 ASSESSMENT — LOCATION SIMPLE DESCRIPTION DERM
LOCATION SIMPLE: UPPER BACK
LOCATION SIMPLE: LEFT PRETIBIAL REGION
LOCATION SIMPLE: RIGHT FOREARM
LOCATION SIMPLE: ABDOMEN
LOCATION SIMPLE: LEFT SHOULDER
LOCATION SIMPLE: LEFT UPPER BACK
LOCATION SIMPLE: RIGHT CALF
LOCATION SIMPLE: LEFT THIGH
LOCATION SIMPLE: RIGHT LOWER BACK
LOCATION SIMPLE: RIGHT UPPER BACK
LOCATION SIMPLE: LEFT LOWER BACK
LOCATION SIMPLE: CHEST
LOCATION SIMPLE: RIGHT SHOULDER
LOCATION SIMPLE: LEFT FOREARM

## 2019-08-22 ASSESSMENT — LOCATION DETAILED DESCRIPTION DERM
LOCATION DETAILED: INFERIOR THORACIC SPINE
LOCATION DETAILED: LEFT ANTERIOR PROXIMAL THIGH
LOCATION DETAILED: LEFT PROXIMAL DORSAL FOREARM
LOCATION DETAILED: LEFT INFERIOR MEDIAL MIDBACK
LOCATION DETAILED: RIGHT SUPERIOR UPPER BACK
LOCATION DETAILED: LEFT SUPERIOR UPPER BACK
LOCATION DETAILED: LEFT MEDIAL SUPERIOR CHEST
LOCATION DETAILED: RIGHT POSTERIOR SHOULDER
LOCATION DETAILED: LEFT INFERIOR MEDIAL UPPER BACK
LOCATION DETAILED: LEFT PROXIMAL PRETIBIAL REGION
LOCATION DETAILED: RIGHT PROXIMAL DORSAL FOREARM
LOCATION DETAILED: RIGHT SUPERIOR MEDIAL MIDBACK
LOCATION DETAILED: RIGHT DISTAL CALF
LOCATION DETAILED: LEFT POSTERIOR SHOULDER
LOCATION DETAILED: LEFT ANTERIOR SHOULDER
LOCATION DETAILED: LEFT DISTAL PRETIBIAL REGION
LOCATION DETAILED: LEFT MEDIAL UPPER BACK
LOCATION DETAILED: EPIGASTRIC SKIN

## 2019-08-22 ASSESSMENT — LOCATION ZONE DERM
LOCATION ZONE: ARM
LOCATION ZONE: TRUNK
LOCATION ZONE: LEG

## 2019-08-22 NOTE — PROCEDURE: SHAVE REMOVAL
Bill [id number] For Specimen Handling/Conveyance To Laboratory?: no
Wound Care: Vaseline
X Size Of Lesion In Cm (Optional): 0
Was A Bandage Applied: Yes
Anesthesia Volume In Cc: 0.4
Anesthesia Type: 1% lidocaine with epinephrine and a 1:10 solution of 8.4% sodium bicarbonate
Consent was obtained from the patient. The risks and benefits to therapy were discussed in detail. Specifically, the risks of infection, scarring, bleeding, prolonged wound healing, incomplete removal, allergy to anesthesia, nerve injury and recurrence were addressed. Prior to the procedure, the treatment site was clearly identified and confirmed by the patient. All components of Universal Protocol/PAUSE Rule completed.
Path Notes (To The Dermatopathologist): Please check margins.
Billing Type: Third-Party Bill
Detail Level: Detailed
Medical Necessity Information: It is in your best interest to select a reason for this procedure from the list below. All of these items fulfill various CMS LCD requirements except the new and changing color options.
Notification Instructions: Patient will be notified of biopsy results. However, patient instructed to call the office if not contacted within 2 weeks.
Biopsy Method: Dermablade
Post-Care Instructions: I reviewed with the patient in detail post-care instructions. Patient is to keep the biopsy site dry overnight, and then apply bacitracin twice daily until healed. Patient may apply hydrogen peroxide soaks to remove any crusting.
Hemostasis: Electrodesiccation
Accession #: PC

## 2020-06-19 ENCOUNTER — HOSPITAL ENCOUNTER (OUTPATIENT)
Dept: MAMMOGRAPHY | Age: 60
Discharge: HOME | End: 2020-06-19
Attending: FAMILY MEDICINE
Payer: COMMERCIAL

## 2020-06-19 DIAGNOSIS — Z12.31 VISIT FOR SCREENING MAMMOGRAM: ICD-10-CM

## 2020-06-19 DIAGNOSIS — Z13.820 SCREENING FOR OSTEOPOROSIS: ICD-10-CM

## 2020-06-19 PROCEDURE — 77080 DXA BONE DENSITY AXIAL: CPT

## 2020-06-19 PROCEDURE — 77067 SCR MAMMO BI INCL CAD: CPT

## 2020-07-07 ENCOUNTER — APPOINTMENT (RX ONLY)
Dept: URBAN - METROPOLITAN AREA CLINIC 23 | Facility: CLINIC | Age: 60
Setting detail: DERMATOLOGY
End: 2020-07-07

## 2020-07-07 DIAGNOSIS — L81.4 OTHER MELANIN HYPERPIGMENTATION: ICD-10-CM

## 2020-07-07 DIAGNOSIS — L82.1 OTHER SEBORRHEIC KERATOSIS: ICD-10-CM

## 2020-07-07 DIAGNOSIS — D22 MELANOCYTIC NEVI: ICD-10-CM

## 2020-07-07 DIAGNOSIS — L57.0 ACTINIC KERATOSIS: ICD-10-CM

## 2020-07-07 DIAGNOSIS — Z71.89 OTHER SPECIFIED COUNSELING: ICD-10-CM

## 2020-07-07 DIAGNOSIS — Z85.828 PERSONAL HISTORY OF OTHER MALIGNANT NEOPLASM OF SKIN: ICD-10-CM

## 2020-07-07 DIAGNOSIS — Z87.2 PERSONAL HISTORY OF DISEASES OF THE SKIN AND SUBCUTANEOUS TISSUE: ICD-10-CM

## 2020-07-07 PROBLEM — J30.1 ALLERGIC RHINITIS DUE TO POLLEN: Status: ACTIVE | Noted: 2020-07-07

## 2020-07-07 PROBLEM — D22.71 MELANOCYTIC NEVI OF RIGHT LOWER LIMB, INCLUDING HIP: Status: ACTIVE | Noted: 2020-07-07

## 2020-07-07 PROBLEM — E03.9 HYPOTHYROIDISM, UNSPECIFIED: Status: ACTIVE | Noted: 2020-07-07

## 2020-07-07 PROBLEM — D22.5 MELANOCYTIC NEVI OF TRUNK: Status: ACTIVE | Noted: 2020-07-07

## 2020-07-07 PROCEDURE — 99214 OFFICE O/P EST MOD 30 MIN: CPT | Mod: 25

## 2020-07-07 PROCEDURE — ? LIQUID NITROGEN

## 2020-07-07 PROCEDURE — ? COUNSELING

## 2020-07-07 PROCEDURE — 17000 DESTRUCT PREMALG LESION: CPT

## 2020-07-07 ASSESSMENT — LOCATION SIMPLE DESCRIPTION DERM
LOCATION SIMPLE: RIGHT PRETIBIAL REGION
LOCATION SIMPLE: LEFT PRETIBIAL REGION
LOCATION SIMPLE: LEFT UPPER BACK
LOCATION SIMPLE: CHEST
LOCATION SIMPLE: TRAPEZIAL NECK
LOCATION SIMPLE: LEFT THIGH
LOCATION SIMPLE: LEFT TEMPLE
LOCATION SIMPLE: RIGHT CALF
LOCATION SIMPLE: ABDOMEN
LOCATION SIMPLE: RIGHT LOWER BACK
LOCATION SIMPLE: UPPER BACK
LOCATION SIMPLE: RIGHT THIGH

## 2020-07-07 ASSESSMENT — LOCATION DETAILED DESCRIPTION DERM
LOCATION DETAILED: RIGHT ANTERIOR PROXIMAL THIGH
LOCATION DETAILED: RIGHT PROXIMAL PRETIBIAL REGION
LOCATION DETAILED: LEFT CENTRAL TEMPLE
LOCATION DETAILED: LEFT SUPERIOR UPPER BACK
LOCATION DETAILED: INFERIOR THORACIC SPINE
LOCATION DETAILED: LEFT DISTAL PRETIBIAL REGION
LOCATION DETAILED: RIGHT PROXIMAL CALF
LOCATION DETAILED: RIGHT MEDIAL SUPERIOR CHEST
LOCATION DETAILED: RIGHT SUPERIOR LATERAL MIDBACK
LOCATION DETAILED: LEFT ANTERIOR DISTAL THIGH
LOCATION DETAILED: MID TRAPEZIAL NECK
LOCATION DETAILED: EPIGASTRIC SKIN
LOCATION DETAILED: LEFT MEDIAL UPPER BACK

## 2020-07-07 ASSESSMENT — LOCATION ZONE DERM
LOCATION ZONE: NECK
LOCATION ZONE: TRUNK
LOCATION ZONE: FACE
LOCATION ZONE: LEG

## 2020-07-07 NOTE — PROCEDURE: LIQUID NITROGEN
Number Of Freeze-Thaw Cycles: 2 freeze-thaw cycles
Detail Level: Simple
Render Note In Bullet Format When Appropriate: No
Post-Care Instructions: I reviewed with the patient in detail post-care instructions. Patient is to wear sunprotection, and avoid picking at any of the treated lesions. Pt may apply Vaseline to crusted or scabbing areas.
Duration Of Freeze Thaw-Cycle (Seconds): 4
Consent: The patient's consent was obtained including but not limited to risks of crusting, scabbing, blistering, scarring, darker or lighter pigmentary change, recurrence, incomplete removal and infection.

## 2021-04-21 PROBLEM — I35.1 NONRHEUMATIC AORTIC VALVE INSUFFICIENCY: Status: ACTIVE | Noted: 2021-04-21

## 2021-05-18 ENCOUNTER — APPOINTMENT (RX ONLY)
Dept: URBAN - METROPOLITAN AREA CLINIC 23 | Facility: CLINIC | Age: 61
Setting detail: DERMATOLOGY
End: 2021-05-18

## 2021-05-18 DIAGNOSIS — L57.8 OTHER SKIN CHANGES DUE TO CHRONIC EXPOSURE TO NONIONIZING RADIATION: ICD-10-CM

## 2021-05-18 DIAGNOSIS — L82.0 INFLAMED SEBORRHEIC KERATOSIS: ICD-10-CM

## 2021-05-18 DIAGNOSIS — L82.1 OTHER SEBORRHEIC KERATOSIS: ICD-10-CM

## 2021-05-18 DIAGNOSIS — Z85.828 PERSONAL HISTORY OF OTHER MALIGNANT NEOPLASM OF SKIN: ICD-10-CM

## 2021-05-18 DIAGNOSIS — D22 MELANOCYTIC NEVI: ICD-10-CM

## 2021-05-18 DIAGNOSIS — Z71.89 OTHER SPECIFIED COUNSELING: ICD-10-CM

## 2021-05-18 DIAGNOSIS — Z87.2 PERSONAL HISTORY OF DISEASES OF THE SKIN AND SUBCUTANEOUS TISSUE: ICD-10-CM

## 2021-05-18 PROBLEM — D22.5 MELANOCYTIC NEVI OF TRUNK: Status: ACTIVE | Noted: 2021-05-18

## 2021-05-18 PROBLEM — D22.71 MELANOCYTIC NEVI OF RIGHT LOWER LIMB, INCLUDING HIP: Status: ACTIVE | Noted: 2021-05-18

## 2021-05-18 PROCEDURE — ? LIQUID NITROGEN

## 2021-05-18 PROCEDURE — 17110 DESTRUCTION B9 LES UP TO 14: CPT

## 2021-05-18 PROCEDURE — 99213 OFFICE O/P EST LOW 20 MIN: CPT | Mod: 25

## 2021-05-18 PROCEDURE — ? COUNSELING

## 2021-05-18 ASSESSMENT — LOCATION SIMPLE DESCRIPTION DERM
LOCATION SIMPLE: UPPER BACK
LOCATION SIMPLE: RIGHT LOWER BACK
LOCATION SIMPLE: LEFT THIGH
LOCATION SIMPLE: LEFT FOREHEAD
LOCATION SIMPLE: ABDOMEN
LOCATION SIMPLE: CHEST
LOCATION SIMPLE: LEFT PRETIBIAL REGION
LOCATION SIMPLE: RIGHT PRETIBIAL REGION
LOCATION SIMPLE: RIGHT CALF
LOCATION SIMPLE: RIGHT UPPER BACK
LOCATION SIMPLE: LEFT UPPER BACK
LOCATION SIMPLE: RIGHT THIGH

## 2021-05-18 ASSESSMENT — LOCATION DETAILED DESCRIPTION DERM
LOCATION DETAILED: RIGHT LATERAL SUPERIOR CHEST
LOCATION DETAILED: RIGHT PROXIMAL CALF
LOCATION DETAILED: RIGHT MEDIAL SUPERIOR CHEST
LOCATION DETAILED: RIGHT SUPERIOR UPPER BACK
LOCATION DETAILED: LEFT ANTERIOR DISTAL THIGH
LOCATION DETAILED: RIGHT SUPERIOR LATERAL MIDBACK
LOCATION DETAILED: LEFT MEDIAL UPPER BACK
LOCATION DETAILED: LEFT MID-UPPER BACK
LOCATION DETAILED: LEFT DISTAL PRETIBIAL REGION
LOCATION DETAILED: RIGHT PROXIMAL PRETIBIAL REGION
LOCATION DETAILED: EPIGASTRIC SKIN
LOCATION DETAILED: RIGHT ANTERIOR PROXIMAL THIGH
LOCATION DETAILED: LEFT MEDIAL FOREHEAD
LOCATION DETAILED: LEFT SUPERIOR UPPER BACK
LOCATION DETAILED: INFERIOR THORACIC SPINE

## 2021-05-18 ASSESSMENT — LOCATION ZONE DERM
LOCATION ZONE: FACE
LOCATION ZONE: LEG
LOCATION ZONE: TRUNK

## 2021-05-18 NOTE — PROCEDURE: COUNSELING
Detail Level: Generalized
Detail Level: Detailed
Sunscreen Recommendations: 50 SPF+, sun protective clothing

## 2021-05-18 NOTE — PROCEDURE: LIQUID NITROGEN
Number Of Freeze-Thaw Cycles: 2 freeze-thaw cycles
Detail Level: Detailed
Add 52 Modifier (Optional): no
Consent: The patient's consent was obtained including but not limited to risks of crusting, scabbing, blistering, scarring, darker or lighter pigmentary change, recurrence, incomplete removal and infection.
Post-Care Instructions: I reviewed with the patient in detail post-care instructions. Patient is to wear sunprotection, and avoid picking at any of the treated lesions. Pt may apply Vaseline to crusted or scabbing areas.
Medical Necessity Clause: This procedure was medically necessary because the lesions was irritated and itchy.inflamed, bleeding.
Medical Necessity Information: It is in your best interest to select a reason for this procedure from the list below. All of these items fulfill various CMS LCD requirements except the new and changing color options.

## 2022-02-17 ENCOUNTER — TRANSCRIBE ORDER (OUTPATIENT)
Dept: SCHEDULING | Age: 62
End: 2022-02-17

## 2022-02-17 DIAGNOSIS — Z12.31 VISIT FOR SCREENING MAMMOGRAM: Primary | ICD-10-CM

## 2022-02-17 DIAGNOSIS — M85.80 OSTEOPENIA, UNSPECIFIED LOCATION: Primary | ICD-10-CM

## 2022-03-19 PROBLEM — I35.1 NONRHEUMATIC AORTIC VALVE INSUFFICIENCY: Status: ACTIVE | Noted: 2021-04-21

## 2022-05-17 ENCOUNTER — APPOINTMENT (RX ONLY)
Dept: URBAN - METROPOLITAN AREA CLINIC 23 | Facility: CLINIC | Age: 62
Setting detail: DERMATOLOGY
End: 2022-05-17

## 2022-05-17 DIAGNOSIS — Z87.2 PERSONAL HISTORY OF DISEASES OF THE SKIN AND SUBCUTANEOUS TISSUE: ICD-10-CM

## 2022-05-17 DIAGNOSIS — L82.1 OTHER SEBORRHEIC KERATOSIS: ICD-10-CM

## 2022-05-17 DIAGNOSIS — Z85.828 PERSONAL HISTORY OF OTHER MALIGNANT NEOPLASM OF SKIN: ICD-10-CM

## 2022-05-17 DIAGNOSIS — L57.8 OTHER SKIN CHANGES DUE TO CHRONIC EXPOSURE TO NONIONIZING RADIATION: ICD-10-CM

## 2022-05-17 DIAGNOSIS — Z71.89 OTHER SPECIFIED COUNSELING: ICD-10-CM

## 2022-05-17 DIAGNOSIS — D22 MELANOCYTIC NEVI: ICD-10-CM

## 2022-05-17 PROBLEM — D22.5 MELANOCYTIC NEVI OF TRUNK: Status: ACTIVE | Noted: 2022-05-17

## 2022-05-17 PROBLEM — D23.5 OTHER BENIGN NEOPLASM OF SKIN OF TRUNK: Status: ACTIVE | Noted: 2022-05-17

## 2022-05-17 PROBLEM — D22.71 MELANOCYTIC NEVI OF RIGHT LOWER LIMB, INCLUDING HIP: Status: ACTIVE | Noted: 2022-05-17

## 2022-05-17 PROBLEM — D22.39 MELANOCYTIC NEVI OF OTHER PARTS OF FACE: Status: ACTIVE | Noted: 2022-05-17

## 2022-05-17 PROCEDURE — 99213 OFFICE O/P EST LOW 20 MIN: CPT

## 2022-05-17 PROCEDURE — ? COUNSELING

## 2022-05-17 ASSESSMENT — LOCATION DETAILED DESCRIPTION DERM
LOCATION DETAILED: RIGHT ANTERIOR PROXIMAL THIGH
LOCATION DETAILED: LEFT ANTERIOR DISTAL THIGH
LOCATION DETAILED: INFERIOR THORACIC SPINE
LOCATION DETAILED: RIGHT MEDIAL SUPERIOR CHEST
LOCATION DETAILED: RIGHT LATERAL SUPERIOR CHEST
LOCATION DETAILED: RIGHT SUPERIOR LATERAL MIDBACK
LOCATION DETAILED: RIGHT SUPERIOR MEDIAL UPPER BACK
LOCATION DETAILED: LEFT DISTAL PRETIBIAL REGION
LOCATION DETAILED: LEFT SUPERIOR UPPER BACK
LOCATION DETAILED: EPIGASTRIC SKIN
LOCATION DETAILED: RIGHT INFERIOR MEDIAL MALAR CHEEK
LOCATION DETAILED: RIGHT PROXIMAL CALF
LOCATION DETAILED: LEFT MEDIAL UPPER BACK
LOCATION DETAILED: LEFT MEDIAL FOREHEAD
LOCATION DETAILED: RIGHT PROXIMAL PRETIBIAL REGION

## 2022-05-17 ASSESSMENT — LOCATION SIMPLE DESCRIPTION DERM
LOCATION SIMPLE: RIGHT CALF
LOCATION SIMPLE: RIGHT CHEEK
LOCATION SIMPLE: LEFT UPPER BACK
LOCATION SIMPLE: UPPER BACK
LOCATION SIMPLE: ABDOMEN
LOCATION SIMPLE: RIGHT LOWER BACK
LOCATION SIMPLE: RIGHT THIGH
LOCATION SIMPLE: LEFT PRETIBIAL REGION
LOCATION SIMPLE: RIGHT PRETIBIAL REGION
LOCATION SIMPLE: LEFT THIGH
LOCATION SIMPLE: RIGHT UPPER BACK
LOCATION SIMPLE: LEFT FOREHEAD
LOCATION SIMPLE: CHEST

## 2022-05-17 ASSESSMENT — LOCATION ZONE DERM
LOCATION ZONE: LEG
LOCATION ZONE: FACE
LOCATION ZONE: TRUNK

## 2022-06-08 ENCOUNTER — HOSPITAL ENCOUNTER (OUTPATIENT)
Dept: MAMMOGRAPHY | Age: 62
Discharge: HOME | End: 2022-06-11
Payer: COMMERCIAL

## 2022-06-08 ENCOUNTER — APPOINTMENT (OUTPATIENT)
Dept: MAMMOGRAPHY | Age: 62
End: 2022-06-08
Payer: COMMERCIAL

## 2022-06-08 DIAGNOSIS — M85.80 OSTEOPENIA, UNSPECIFIED LOCATION: ICD-10-CM

## 2022-06-08 DIAGNOSIS — Z12.31 VISIT FOR SCREENING MAMMOGRAM: ICD-10-CM

## 2022-06-08 PROCEDURE — 77080 DXA BONE DENSITY AXIAL: CPT

## 2022-06-08 PROCEDURE — 77067 SCR MAMMO BI INCL CAD: CPT

## 2023-02-14 ENCOUNTER — APPOINTMENT (RX ONLY)
Dept: URBAN - METROPOLITAN AREA CLINIC 23 | Facility: CLINIC | Age: 63
Setting detail: DERMATOLOGY
End: 2023-02-14

## 2023-02-14 DIAGNOSIS — Z71.89 OTHER SPECIFIED COUNSELING: ICD-10-CM

## 2023-02-14 DIAGNOSIS — Z85.828 PERSONAL HISTORY OF OTHER MALIGNANT NEOPLASM OF SKIN: ICD-10-CM

## 2023-02-14 DIAGNOSIS — L57.8 OTHER SKIN CHANGES DUE TO CHRONIC EXPOSURE TO NONIONIZING RADIATION: ICD-10-CM

## 2023-02-14 DIAGNOSIS — L82.1 OTHER SEBORRHEIC KERATOSIS: ICD-10-CM

## 2023-02-14 DIAGNOSIS — Z87.2 PERSONAL HISTORY OF DISEASES OF THE SKIN AND SUBCUTANEOUS TISSUE: ICD-10-CM

## 2023-02-14 DIAGNOSIS — D22 MELANOCYTIC NEVI: ICD-10-CM

## 2023-02-14 DIAGNOSIS — B96.89 OTHER SPECIFIED BACTERIAL AGENTS AS THE CAUSE OF DISEASES CLASSIFIED ELSEWHERE: ICD-10-CM

## 2023-02-14 PROBLEM — D22.39 MELANOCYTIC NEVI OF OTHER PARTS OF FACE: Status: ACTIVE | Noted: 2023-02-14

## 2023-02-14 PROBLEM — D22.71 MELANOCYTIC NEVI OF RIGHT LOWER LIMB, INCLUDING HIP: Status: ACTIVE | Noted: 2023-02-14

## 2023-02-14 PROBLEM — D22.5 MELANOCYTIC NEVI OF TRUNK: Status: ACTIVE | Noted: 2023-02-14

## 2023-02-14 PROCEDURE — 99214 OFFICE O/P EST MOD 30 MIN: CPT

## 2023-02-14 PROCEDURE — ? COUNSELING

## 2023-02-14 PROCEDURE — ? PRESCRIPTION

## 2023-02-14 RX ORDER — CICLOPIROX OLAMINE 7.7 MG/G
CREAM TOPICAL
Qty: 30 | Refills: 0 | Status: ERX | COMMUNITY
Start: 2023-02-14

## 2023-02-14 RX ADMIN — CICLOPIROX OLAMINE: 7.7 CREAM TOPICAL at 00:00

## 2023-02-14 ASSESSMENT — LOCATION DETAILED DESCRIPTION DERM
LOCATION DETAILED: RIGHT INFERIOR MEDIAL MALAR CHEEK
LOCATION DETAILED: RIGHT PROXIMAL CALF
LOCATION DETAILED: LEFT MEDIAL 5TH TOE
LOCATION DETAILED: RIGHT MEDIAL SUPERIOR CHEST
LOCATION DETAILED: RIGHT ANTERIOR PROXIMAL THIGH
LOCATION DETAILED: RIGHT SUPERIOR LATERAL MIDBACK
LOCATION DETAILED: RIGHT LATERAL SUPERIOR CHEST
LOCATION DETAILED: RIGHT SUPERIOR MEDIAL UPPER BACK
LOCATION DETAILED: LEFT DISTAL PRETIBIAL REGION
LOCATION DETAILED: EPIGASTRIC SKIN
LOCATION DETAILED: LEFT MEDIAL FOREHEAD
LOCATION DETAILED: RIGHT PROXIMAL PRETIBIAL REGION
LOCATION DETAILED: LEFT SUPERIOR UPPER BACK
LOCATION DETAILED: LEFT ANTERIOR DISTAL THIGH
LOCATION DETAILED: INFERIOR THORACIC SPINE
LOCATION DETAILED: LEFT MEDIAL UPPER BACK

## 2023-02-14 ASSESSMENT — LOCATION SIMPLE DESCRIPTION DERM
LOCATION SIMPLE: RIGHT PRETIBIAL REGION
LOCATION SIMPLE: LEFT PRETIBIAL REGION
LOCATION SIMPLE: CHEST
LOCATION SIMPLE: RIGHT CHEEK
LOCATION SIMPLE: RIGHT CALF
LOCATION SIMPLE: RIGHT THIGH
LOCATION SIMPLE: ABDOMEN
LOCATION SIMPLE: LEFT UPPER BACK
LOCATION SIMPLE: LEFT 5TH TOE
LOCATION SIMPLE: RIGHT UPPER BACK
LOCATION SIMPLE: UPPER BACK
LOCATION SIMPLE: RIGHT LOWER BACK
LOCATION SIMPLE: LEFT FOREHEAD
LOCATION SIMPLE: LEFT THIGH

## 2023-02-14 ASSESSMENT — LOCATION ZONE DERM
LOCATION ZONE: FACE
LOCATION ZONE: TRUNK
LOCATION ZONE: LEG
LOCATION ZONE: TOE

## 2023-03-22 ENCOUNTER — HOSPITAL ENCOUNTER (OUTPATIENT)
Dept: CT IMAGING | Age: 63
Discharge: HOME | End: 2023-03-25

## 2023-03-22 ENCOUNTER — HOSPITAL ENCOUNTER (OUTPATIENT)
Dept: MRI IMAGING | Age: 63
Discharge: HOME | End: 2023-03-25

## 2023-03-22 DIAGNOSIS — R59.9 LYMPH NODE ENLARGEMENT: ICD-10-CM

## 2023-03-22 DIAGNOSIS — M54.2 NECK PAIN: ICD-10-CM

## 2023-03-22 DIAGNOSIS — M50.30 DDD (DEGENERATIVE DISC DISEASE), CERVICAL: ICD-10-CM

## 2023-03-22 DIAGNOSIS — M47.9 OSTEOARTHRITIS OF SPINE, UNSPECIFIED SPINAL OSTEOARTHRITIS COMPLICATION STATUS, UNSPECIFIED SPINAL REGION: ICD-10-CM

## 2024-06-13 ENCOUNTER — HOSPITAL ENCOUNTER (OUTPATIENT)
Dept: MAMMOGRAPHY | Age: 64
Discharge: HOME | End: 2024-06-13
Payer: COMMERCIAL

## 2024-06-13 VITALS — WEIGHT: 175 LBS | BODY MASS INDEX: 27.41 KG/M2

## 2024-06-13 DIAGNOSIS — Z12.31 VISIT FOR SCREENING MAMMOGRAM: ICD-10-CM

## 2024-06-13 PROCEDURE — 77063 BREAST TOMOSYNTHESIS BI: CPT

## 2024-06-13 PROCEDURE — 77067 SCR MAMMO BI INCL CAD: CPT

## 2024-07-09 ENCOUNTER — HOSPITAL ENCOUNTER (OUTPATIENT)
Dept: MAMMOGRAPHY | Age: 64
Discharge: HOME | End: 2024-07-12
Payer: COMMERCIAL

## 2024-07-09 DIAGNOSIS — R92.8 ABNORMAL SCREENING MAMMOGRAM: ICD-10-CM

## 2024-07-09 PROCEDURE — G0279 TOMOSYNTHESIS, MAMMO: HCPCS

## 2024-07-09 PROCEDURE — 76642 ULTRASOUND BREAST LIMITED: CPT

## 2024-07-23 ENCOUNTER — HOSPITAL ENCOUNTER (OUTPATIENT)
Dept: MAMMOGRAPHY | Age: 64
Discharge: HOME | End: 2024-07-26
Payer: COMMERCIAL

## 2024-07-23 DIAGNOSIS — R92.8 ABNORMAL ULTRASOUND OF BREAST: ICD-10-CM

## 2024-07-23 PROCEDURE — 77065 DX MAMMO INCL CAD UNI: CPT

## 2024-07-23 PROCEDURE — 88305 TISSUE EXAM BY PATHOLOGIST: CPT

## 2024-07-23 PROCEDURE — 19083 BX BREAST 1ST LESION US IMAG: CPT

## 2024-07-23 PROCEDURE — 2500000003 HC RX 250 WO HCPCS: Performed by: FAMILY MEDICINE

## 2024-07-23 RX ORDER — LIDOCAINE HYDROCHLORIDE 10 MG/ML
10 INJECTION, SOLUTION INFILTRATION; PERINEURAL ONCE
Status: COMPLETED | OUTPATIENT
Start: 2024-07-23 | End: 2024-07-23

## 2024-07-23 RX ADMIN — LIDOCAINE HYDROCHLORIDE 10 ML: 10 INJECTION, SOLUTION INFILTRATION; PERINEURAL at 08:47

## 2024-07-25 ENCOUNTER — CLINICAL DOCUMENTATION (OUTPATIENT)
Dept: MAMMOGRAPHY | Age: 64
End: 2024-07-25

## 2024-07-25 NOTE — PROGRESS NOTES
Patient presented to the breast center with her  for biopsy results. I informed the patient that her biopsy results came back as Right Breast IDC. The radiologist is recommending a bilateral breast MRI which is scheduled for 07/26. I provided her with an information packet that included her MRI appointment, pathology report, and points of contact at the oncology center. I informed her that one of our breast oncology navigators would be reaching out in the coming days to discuss her recent diagnosis and also provide her with consultation appointments for both surgery and oncology. Patient also made mention of trip during September for two weeks to [place]. I informed her that we do our best to work around patient's schedules so just be sure to mention it to her surgeon and oncologist when she meets with them.

## 2024-07-26 ENCOUNTER — TELEPHONE (OUTPATIENT)
Dept: ONCOLOGY | Age: 64
End: 2024-07-26

## 2024-07-26 ENCOUNTER — HOSPITAL ENCOUNTER (OUTPATIENT)
Dept: MRI IMAGING | Age: 64
Discharge: HOME | End: 2024-07-26
Payer: COMMERCIAL

## 2024-07-26 DIAGNOSIS — C50.911 INFILTRATING DUCTAL CARCINOMA OF BREAST, RIGHT (HCC): ICD-10-CM

## 2024-07-26 PROCEDURE — 6360000004 HC RX CONTRAST MEDICATION: Performed by: FAMILY MEDICINE

## 2024-07-26 PROCEDURE — C8908 MRI W/O FOL W/CONT, BREAST,: HCPCS

## 2024-07-26 PROCEDURE — A9579 GAD-BASE MR CONTRAST NOS,1ML: HCPCS | Performed by: FAMILY MEDICINE

## 2024-07-26 RX ADMIN — GADOTERIDOL 15 ML: 279.3 INJECTION, SOLUTION INTRAVENOUS at 11:08

## 2024-07-26 NOTE — TELEPHONE ENCOUNTER
7/26/2024  Breast Navigation  intake complete for New Patient Breast Cancer.  Time spent 25 minutes.  Reviewed role of navigation, gave contact information for navigators, discussed pathology report and  pending receptor status, reviewed upcoming appointments.  Patient is employed as a professional fiduciary .  Independent in self care.  No physical limitations.  Barriers:  No financial, psychosocial,or transportation barriers noted.  Lives with spouse Roddy who is her primary support person and her 22 year old doctor.    Verbal permission given to speak with spouse.  Family history of breast cancer:  none  Other cancers:  Mother and father with melanoma.  PGM and PGF lung cancer, MGM lung cancer.  Type of cancer: Right breast IDC  MRI   7-26-24  Social determinants of health and Depression screen complete.  Health Care Power of  form link sent via My Chart .     Referring Provider:  Dinah Scott MD  Added MD and Navigator to treatment team.  Appointment with Oncology: Dr. Tequila Rodríguez 8-1-24 .  Appointment with Surgery: Dr. Dominick Samano 8-2-24.  My Chart message to patient with navigation contact information and upcoming appointments.   Attached link for  [facility] for Cancer Support in the Community provided by the [facility] with opportunities for programs both in person and virtually.  Also sent link for the [facility] for counseling opportunities, support groups, financial assistance, and general physical support.  Information on patient's Oncology Medical Home sent via My Chart.  Routed note to referring provider regarding intake and upcoming appointments.   Next outreach planned 7-30-24 to follow up on receptors and MRI results, 8-2-24 follow up after oncology consult and surgery consult, and 8-8 TB.

## 2024-07-29 NOTE — PROGRESS NOTES
[facility] Hematology and Oncology: New Patient - Consultation    Chief Complaint   Patient presents with    New Patient     Referral Diagnosis: R IDC  Referring Provider: Dinah Scott MD  Family History of Cancer/ Hematology Disorders: Sister with thyroid cancer  Presenting Symptoms: Abnormal bilateral digital mammogram and tomosynthesis with CAD    History of Present Illness:  Ms. Gomez is a 64 y.o. female who presents today in referral from Dr. Scott for consultation regarding breast cancer.  The past medical history is significant for Aortic regurgitation, BCC, Cataract, DDD, Diverticulosis, Endometriosis, Gastritis, Goiter, Hx of fracture of left radial head (2020), Hypothyroidism, Lactose intolerant, Osteopenia  Pt travelling to [place] mid Sept.      During today's visit we discussed the pathophysiology of breast cancer, staging, and the importance of receptor status in terms of treatment options.  We then reviewed her medical history as well as oncologic history, recent imaging and pathology in detail.  We utilized the white board to go over key concepts and augment discussion.  She was here with her .  We reviewed sequencing of therapy and typical modalities involved (surgery, systemic therapy and radiation).  We also discussed the role of 21-gene assay and it's predictive/prognostic value.       Chronological Events:   4/30/24 - Met with PCP (EPIC)Here for AWV; Screening mammogram ordered.  6/13/24 - Bilateral digital mammogram and tomosynthesis with CAD (EPIC)  IMPRESSION:  No evidence of a significant new finding in the left breast and no mammographic evidence of left breast malignancy  Focal architectural distortion noted on tomosynthesis sequences of the right  breast. On the accompanying patient history form, the patient noted having  previous breast biopsies and it is uncertain if this focus may be postsurgical  or having a more ominous etiology. Diagnostic mammogram, to include

## 2024-07-30 ENCOUNTER — CLINICAL DOCUMENTATION (OUTPATIENT)
Dept: CASE MANAGEMENT | Age: 64
End: 2024-07-30

## 2024-07-30 NOTE — PROGRESS NOTES
Chart review per breast navigation.  MRI complete and pathology complete. The patient has planned initial oncology visit 8/1 with Dr Rodríguez and initial consult with Dr Samano 8/2.

## 2024-08-01 ENCOUNTER — OFFICE VISIT (OUTPATIENT)
Dept: ONCOLOGY | Age: 64
End: 2024-08-01
Payer: COMMERCIAL

## 2024-08-01 VITALS
HEIGHT: 67 IN | BODY MASS INDEX: 28.25 KG/M2 | RESPIRATION RATE: 18 BRPM | DIASTOLIC BLOOD PRESSURE: 68 MMHG | SYSTOLIC BLOOD PRESSURE: 135 MMHG | HEART RATE: 87 BPM | OXYGEN SATURATION: 95 % | TEMPERATURE: 98.3 F | WEIGHT: 180 LBS

## 2024-08-01 DIAGNOSIS — C50.211 MALIGNANT NEOPLASM OF UPPER-INNER QUADRANT OF RIGHT BREAST IN FEMALE, ESTROGEN RECEPTOR POSITIVE (HCC): Primary | ICD-10-CM

## 2024-08-01 DIAGNOSIS — Z17.0 MALIGNANT NEOPLASM OF UPPER-INNER QUADRANT OF RIGHT BREAST IN FEMALE, ESTROGEN RECEPTOR POSITIVE (HCC): Primary | ICD-10-CM

## 2024-08-01 DIAGNOSIS — Z17.0 ESTROGEN RECEPTOR POSITIVE: ICD-10-CM

## 2024-08-01 PROCEDURE — 99205 OFFICE O/P NEW HI 60 MIN: CPT | Performed by: INTERNAL MEDICINE

## 2024-08-01 ASSESSMENT — PATIENT HEALTH QUESTIONNAIRE - PHQ9
2. FEELING DOWN, DEPRESSED OR HOPELESS: NOT AT ALL
SUM OF ALL RESPONSES TO PHQ QUESTIONS 1-9: 0
1. LITTLE INTEREST OR PLEASURE IN DOING THINGS: NOT AT ALL
SUM OF ALL RESPONSES TO PHQ QUESTIONS 1-9: 0
SUM OF ALL RESPONSES TO PHQ QUESTIONS 1-9: 0
SUM OF ALL RESPONSES TO PHQ9 QUESTIONS 1 & 2: 0
SUM OF ALL RESPONSES TO PHQ QUESTIONS 1-9: 0

## 2024-08-01 NOTE — PROGRESS NOTES
H&P/Consult Note/Progress Note/Office Note:   Sudha Gomez  MRN: [id number]  :1960  Age:64 y.o.    HPI: Sudha Gomez is a 64 y.o. female who was referred in consultation from Dinah Scott MD and the [facility] breast center   for newly diagnosed cT1bN0 right breast low grade IDC, ER 94%, Her2 negative.    She initially presented with architectural distortion of the right UIQ 5 cm from the nipple on screening mammo.    This was evaluated further with diagnostic mammography and US identifying a 7 mm spiculated right breast mass.  US-guided biopsy identified low grade IDC, ER+, HER2-.  MRI shown below identified no other areas of concern with the index malignancy at 10 mm.      She reported a prior bilateral benign breast biopsies in ; fibroadenoma  No family h/o breast cancer.          24 bilat screening mammo with nenita and CAD  Hx:    Estimated lifetime risk of breast cancer calculated to be 10.75% based on the Tyrer-Cuzick  model.    American Cancer Society screening recommendation: Women who are at high risk for breast cancer based on certain factors should get a breast MRI and a mammogram  every year, typically starting at age 30. This includes women who have a lifetime risk of breast cancer about 20 to 25% or greater, according to risk assessment tools that are based mainly on family history.     BREAST DENSITY: The breasts are heterogeneously dense, which may obscure small masses. (#BDC)     FINDINGS: On the conventional 2D digital mammographic images, no suspicious new mass, asymmetry, or focus of architectural distortion is noted.   These images do not appear significantly changed from prior mammograms. None of the prior exams included tomosynthesis.      On the tomosynthesis sequences of the right breast, architectural distortion is noted in the upper inner quadrant   approximately 5 cm from the nipple (CC 43/72; MLO 50/76).     Benign-appearing calcifications are present

## 2024-08-01 NOTE — PATIENT INSTRUCTIONS
during your journey from diagnosis, treatment, and follow up care. The members of your team consists of the following positions:    Oncologist - This is the physician leading your team and directing your care. You will have office visits with your physician provider where they will give you information regarding your workup and diagnosis, treatment recommendations, management of symptoms related to diagnosis and treatment, prescribe medications, etc.     Physician Provider: Tequila Rodríguez Medical Oncologist    Advanced Practice Clinician (NP or PA) - This is an advanced practice clinician (APC) who will be collaborating with your oncologist throughout the course of your journey. You will have regular office visits with your APC for pre-treatment visits, chemotherapy education (if applicable), problem visits, follow up visits etc.    Advanced Practice Clinician: Gail Darling NP    Pod RN - This is a registered nurse (RN) who works with your physician and APC to provide clinical support for you during and outside of your office visits. Examples of support the RN provides are: patient education, answers MyChart messages, order management (prescription refill, scans, etc.), symptom management.    Registered Nurse: Joann GEIGER RN    Pod MA - This is a medical assistant (GIANNI or MA) who works closely with the above members of your clinical team. You will see them at every visit. They will take your vital signs, review   your medical history (medications, allergies, new changes), assist with medical forms (i.e. FMLA), assist your provider and RN, etc.     Medical Assistant: Preethi CRUZ CMA    Pod  - Your  will work closely with the Pod RN and Pod MA. They will schedule and coordinate your Cancer Center appointments.      :Julita BRISENO    Oncology Nurse Navigator - This is a registered nurse assigned to your care team. They are your advocate throughout your patient journey. They will reach out

## 2024-08-02 ENCOUNTER — PREP FOR PROCEDURE (OUTPATIENT)
Dept: SURGERY | Age: 64
End: 2024-08-02

## 2024-08-02 ENCOUNTER — OFFICE VISIT (OUTPATIENT)
Dept: SURGERY | Age: 64
End: 2024-08-02

## 2024-08-02 ENCOUNTER — CLINICAL DOCUMENTATION (OUTPATIENT)
Dept: CASE MANAGEMENT | Age: 64
End: 2024-08-02

## 2024-08-02 VITALS
SYSTOLIC BLOOD PRESSURE: 129 MMHG | HEIGHT: 67 IN | HEART RATE: 86 BPM | BODY MASS INDEX: 28.25 KG/M2 | WEIGHT: 180 LBS | DIASTOLIC BLOOD PRESSURE: 101 MMHG

## 2024-08-02 DIAGNOSIS — Z17.0 MALIGNANT NEOPLASM OF UPPER-INNER QUADRANT OF RIGHT BREAST IN FEMALE, ESTROGEN RECEPTOR POSITIVE (HCC): Primary | ICD-10-CM

## 2024-08-02 DIAGNOSIS — Z17.0 ESTROGEN RECEPTOR POSITIVE: ICD-10-CM

## 2024-08-02 DIAGNOSIS — C50.211 MALIGNANT NEOPLASM OF UPPER-INNER QUADRANT OF RIGHT BREAST IN FEMALE, ESTROGEN RECEPTOR POSITIVE (HCC): Primary | ICD-10-CM

## 2024-08-02 NOTE — PROGRESS NOTES
My chart message complete with patient in follow up after her initial oncology visit. Surgery is scheduled for 10/2. Will follow for surgical pathology and oncotype submission. Patient will be presented at [facility] 8/8

## 2024-08-05 DIAGNOSIS — C50.211 MALIGNANT NEOPLASM OF UPPER-INNER QUADRANT OF RIGHT BREAST IN FEMALE, ESTROGEN RECEPTOR POSITIVE (HCC): Primary | ICD-10-CM

## 2024-08-05 DIAGNOSIS — Z17.0 MALIGNANT NEOPLASM OF UPPER-INNER QUADRANT OF RIGHT BREAST IN FEMALE, ESTROGEN RECEPTOR POSITIVE (HCC): Primary | ICD-10-CM

## 2024-08-06 ENCOUNTER — TELEPHONE (OUTPATIENT)
Dept: SURGERY | Age: 64
End: 2024-08-06

## 2024-08-10 ASSESSMENT — ENCOUNTER SYMPTOMS
DIARRHEA: 0
ABDOMINAL DISTENTION: 0
CHEST TIGHTNESS: 0
VOMITING: 0
CONSTIPATION: 0
SHORTNESS OF BREATH: 0
HEMOPTYSIS: 0
WHEEZING: 0
ABDOMINAL PAIN: 0
TROUBLE SWALLOWING: 0
SORE THROAT: 0
NAUSEA: 0
BLOOD IN STOOL: 0
SCLERAL ICTERUS: 0
VOICE CHANGE: 0

## 2024-08-19 PROBLEM — C50.411 MALIGNANT NEOPLASM OF UPPER-OUTER QUADRANT OF RIGHT BREAST IN FEMALE, ESTROGEN RECEPTOR POSITIVE (HCC): Status: ACTIVE | Noted: 2024-08-19

## 2024-08-19 PROBLEM — Z17.0 MALIGNANT NEOPLASM OF UPPER-OUTER QUADRANT OF RIGHT BREAST IN FEMALE, ESTROGEN RECEPTOR POSITIVE (HCC): Status: ACTIVE | Noted: 2024-08-19

## 2024-08-19 NOTE — PROGRESS NOTES
[facility] CARDIOLOGY  [address] [place] [address], [address]  [place] [address]  PHONE: [phone]      24    NAME:  Sudha Gomez  : 1960  MRN: [id number]         SUBJECTIVE:   Sudha Gomez is a 64 y.o. female seen for a follow up visit regarding the following:     Chief Complaint   Patient presents with    Nonrheumatic aortic valve insufficiency    Mitral Valve Prolapse            HPI:  Follow up  Nonrheumatic aortic valve insufficiency and Mitral Valve Prolapse   .    Patient has previously been followed for AI/MR, both very mild/minimal with plans for echo again in 2-3 years or for clinical change.  Sadly, returns today for perioperative risk assessment prior to surgery for recently diagnosed breast cancer. Upper right breast, ER +, appears to be early/low stage without evidence of distant mets.        She is established with [facility].  There are no plans for chemo but there will be radiation  Her surgery will include breast reduction.  She already has follow up with lymphedema specialist and for possible gene testing.  She feels fine, has continued to exercise regularly.                   Past cardiac history:   Sep 2017 - posterior leaflet prolapse, mild MR, mild to moderate AI, normal LV size and EF   2019- mvp, mild MR, mild AI, aortic root 4.1 at sinuses    Mar 2020- normal SF and DF, ao 3.4 cm, mild AI, minimal MR   2022- normal SF, ind DF, normal LV size, ao 3.6 cm, mod AI trileaflet valve, mild bilateral MVP with mild MR  2024       Right breast cancer - ER+, low grade                    Past Medical History, Past Surgical History, Family history, Social History, and Medications were all reviewed with the patient today and updated as necessary.     Prior to Admission medications    Medication Sig Start Date End Date Taking? Authorizing Provider   Levothyroxine Sodium (TIROSINT) 125 MCG CAPS Take 125 mcg by mouth daily   Yes Provider, MD Mikey   letrozole (FEMARA)

## 2024-08-20 ENCOUNTER — OFFICE VISIT (OUTPATIENT)
Age: 64
End: 2024-08-20
Payer: COMMERCIAL

## 2024-08-20 VITALS
SYSTOLIC BLOOD PRESSURE: 120 MMHG | HEART RATE: 73 BPM | BODY MASS INDEX: 28.25 KG/M2 | WEIGHT: 180 LBS | DIASTOLIC BLOOD PRESSURE: 80 MMHG | HEIGHT: 67 IN

## 2024-08-20 DIAGNOSIS — Z17.0 MALIGNANT NEOPLASM OF UPPER-OUTER QUADRANT OF RIGHT BREAST IN FEMALE, ESTROGEN RECEPTOR POSITIVE (HCC): ICD-10-CM

## 2024-08-20 DIAGNOSIS — I35.1 NONRHEUMATIC AORTIC VALVE INSUFFICIENCY: ICD-10-CM

## 2024-08-20 DIAGNOSIS — C50.411 MALIGNANT NEOPLASM OF UPPER-OUTER QUADRANT OF RIGHT BREAST IN FEMALE, ESTROGEN RECEPTOR POSITIVE (HCC): ICD-10-CM

## 2024-08-20 DIAGNOSIS — Z01.818 PRE-OP EVALUATION: Primary | ICD-10-CM

## 2024-08-20 DIAGNOSIS — I34.1 MVP (MITRAL VALVE PROLAPSE): ICD-10-CM

## 2024-08-20 PROCEDURE — 93000 ELECTROCARDIOGRAM COMPLETE: CPT | Performed by: INTERNAL MEDICINE

## 2024-08-20 PROCEDURE — 99214 OFFICE O/P EST MOD 30 MIN: CPT | Performed by: INTERNAL MEDICINE

## 2024-08-20 RX ORDER — LEVOTHYROXINE SODIUM 125 UG/1
125 CAPSULE ORAL DAILY
COMMUNITY

## 2024-08-20 RX ORDER — LETROZOLE 2.5 MG/1
2.5 TABLET, FILM COATED ORAL DAILY
COMMUNITY
Start: 2024-08-13 | End: 2024-11-11

## 2024-08-20 ASSESSMENT — ENCOUNTER SYMPTOMS: SHORTNESS OF BREATH: 0

## 2025-06-16 ENCOUNTER — TRANSCRIBE ORDERS (OUTPATIENT)
Dept: SCHEDULING | Age: 65
End: 2025-06-16

## 2025-06-16 DIAGNOSIS — N95.8 OTHER SPECIFIED MENOPAUSAL AND PERIMENOPAUSAL DISORDERS: Primary | ICD-10-CM

## 2025-06-26 ENCOUNTER — HOSPITAL ENCOUNTER (OUTPATIENT)
Dept: MAMMOGRAPHY | Age: 65
Discharge: HOME | End: 2025-06-29
Payer: COMMERCIAL

## 2025-06-26 DIAGNOSIS — N95.8 OTHER SPECIFIED MENOPAUSAL AND PERIMENOPAUSAL DISORDERS: ICD-10-CM

## 2025-06-26 PROCEDURE — 77080 DXA BONE DENSITY AXIAL: CPT
